# Patient Record
Sex: FEMALE | Race: WHITE | NOT HISPANIC OR LATINO | Employment: FULL TIME | URBAN - METROPOLITAN AREA
[De-identification: names, ages, dates, MRNs, and addresses within clinical notes are randomized per-mention and may not be internally consistent; named-entity substitution may affect disease eponyms.]

---

## 2024-11-22 ENCOUNTER — APPOINTMENT (OUTPATIENT)
Dept: OBSTETRICS AND GYNECOLOGY | Facility: CLINIC | Age: 34
End: 2024-11-22
Payer: COMMERCIAL

## 2024-11-22 VITALS — SYSTOLIC BLOOD PRESSURE: 138 MMHG | DIASTOLIC BLOOD PRESSURE: 88 MMHG | WEIGHT: 211.8 LBS | BODY MASS INDEX: 36.36 KG/M2

## 2024-11-22 DIAGNOSIS — Z32.01 PREGNANCY TEST POSITIVE (HHS-HCC): Primary | ICD-10-CM

## 2024-11-22 DIAGNOSIS — O99.210 OBESITY IN PREGNANCY (HHS-HCC): ICD-10-CM

## 2024-11-22 DIAGNOSIS — Z12.4 CERVICAL CANCER SCREENING: ICD-10-CM

## 2024-11-22 DIAGNOSIS — Z3A.01 6 WEEKS GESTATION OF PREGNANCY (HHS-HCC): ICD-10-CM

## 2024-11-22 DIAGNOSIS — Z34.80 SUPERVISION OF OTHER NORMAL PREGNANCY, ANTEPARTUM (HHS-HCC): ICD-10-CM

## 2024-11-22 PROCEDURE — 87491 CHLMYD TRACH DNA AMP PROBE: CPT

## 2024-11-22 PROCEDURE — 87591 N.GONORRHOEAE DNA AMP PROB: CPT

## 2024-11-22 PROCEDURE — 87086 URINE CULTURE/COLONY COUNT: CPT

## 2024-11-22 PROCEDURE — 0500F INITIAL PRENATAL CARE VISIT: CPT

## 2024-11-22 PROCEDURE — 87661 TRICHOMONAS VAGINALIS AMPLIF: CPT

## 2024-11-22 ASSESSMENT — EDINBURGH POSTNATAL DEPRESSION SCALE (EPDS)
THINGS HAVE BEEN GETTING ON TOP OF ME: NO, I HAVE BEEN COPING AS WELL AS EVER
THE THOUGHT OF HARMING MYSELF HAS OCCURRED TO ME: NEVER
I HAVE BEEN ABLE TO LAUGH AND SEE THE FUNNY SIDE OF THINGS: AS MUCH AS I ALWAYS COULD
I HAVE FELT SAD OR MISERABLE: NO, NOT AT ALL
I HAVE BLAMED MYSELF UNNECESSARILY WHEN THINGS WENT WRONG: NO, NEVER
TOTAL SCORE: 0
I HAVE LOOKED FORWARD WITH ENJOYMENT TO THINGS: AS MUCH AS I EVER DID
I HAVE BEEN SO UNHAPPY THAT I HAVE HAD DIFFICULTY SLEEPING: NOT AT ALL
I HAVE BEEN ANXIOUS OR WORRIED FOR NO GOOD REASON: NO, NOT AT ALL
I HAVE FELT SCARED OR PANICKY FOR NO GOOD REASON: NO, NOT AT ALL
I HAVE BEEN SO UNHAPPY THAT I HAVE BEEN CRYING: NO, NEVER

## 2024-11-22 NOTE — PROGRESS NOTES
"Ziyad Lu \"Ruth\" is a 34 y.o.  at 5w6d with a working estimated date of delivery of 2025, by Last Menstrual Period who presents for an initial prenatal visit. This pregnancy is unplanned, she is nervous but accepting.    Patient currently experiencing: nausea,    Bleeding or cramping since LMP: no  Taking prenatal vitamin: Yes  Ultrasound completed this pregnancy: No  Last pap: unknown     OB History    Para Term  AB Living   4 3 3     3   SAB IAB Ectopic Multiple Live Births           3      # Outcome Date GA Lbr Kolby/2nd Weight Sex Type Anes PTL Lv   4 Current            3 Term 12/20/15 39w0d   F Vag-Spont   RAVEN   2 Term 09 40w0d   M Vag-Spont   RAVEN   1 Term 07 40w0d   M Vag-Spont EPI  RAVEN      Complications: Hypertension     Prior pregnancy complications:  none   History of hypertension:  No    No past medical history on file.   No past surgical history on file.   Social History     Socioeconomic History    Marital status:      Spouse name: Sydney    Number of children: 3   Tobacco Use    Smoking status: Never    Smokeless tobacco: Never   Vaping Use    Vaping status: Never Used   Substance and Sexual Activity    Alcohol use: Never    Drug use: Never    Sexual activity: Yes     Partners: Male      [place]  Depression Scale Total: 0    Objective   Physical Exam  Weight: 96.1 kg (211 lb 12.8 oz)  TW.631 kg (5 lb 12.8 oz)   Pregravid BMI: 35.34  BP: 138/88    Physical Exam  Constitutional:       General: She is not in acute distress.     Appearance: Normal appearance. She is normal weight. She is not ill-appearing.   Genitourinary:      Vulva, rectum and urethral meatus normal.      No lesions in the vagina.      Right Labia: No rash, lesions, skin changes or Bartholin's cyst.     Left Labia: No lesions, skin changes, Bartholin's cyst or rash.     No labial fusion noted.      No vaginal discharge, erythema or tenderness.        Right " Adnexa: not tender, not full and no mass present.     Left Adnexa: not tender, not full and no mass present.     No cervical discharge, friability, lesion, polyp or nabothian cyst.      Uterus is not enlarged, fixed, tender or prolapsed.      No uterine mass detected.     Pelvic exam was performed with patient in the lithotomy position.   HENT:      Head: Normocephalic.      Nose: Nose normal. No congestion.      Mouth/Throat:      Mouth: Mucous membranes are moist.      Pharynx: Oropharynx is clear. No oropharyngeal exudate or posterior oropharyngeal erythema.   Eyes:      General:         Right eye: No discharge.         Left eye: No discharge.      Extraocular Movements: Extraocular movements intact.      Conjunctiva/sclera: Conjunctivae normal.      Pupils: Pupils are equal, round, and reactive to light.   Cardiovascular:      Rate and Rhythm: Normal rate and regular rhythm.      Heart sounds: Normal heart sounds.   Pulmonary:      Effort: Pulmonary effort is normal.      Breath sounds: Normal breath sounds and air entry.   Abdominal:      General: Abdomen is flat. There is no distension.      Palpations: Abdomen is soft.   Musculoskeletal:         General: No swelling, deformity or signs of injury. Normal range of motion.      Cervical back: Full passive range of motion without pain and normal range of motion.   Neurological:      General: No focal deficit present.      Mental Status: She is alert and oriented to person, place, and time. Mental status is at baseline.      Sensory: No sensory deficit.      Motor: No weakness.      Gait: Gait normal.   Skin:     General: Skin is warm and dry.      Findings: No bruising, erythema, lesion or rash.   Psychiatric:         Mood and Affect: Mood normal.         Behavior: Behavior normal.         Thought Content: Thought content normal.         Judgment: Judgment normal.   Vitals reviewed.          Assessment   Problem List Items Addressed This Visit       6 weeks  gestation of pregnancy ([facility])    Overview     Desired provider in labor: [x] CNM  [] Physician  [x] Blood Products: [x] Yes, accepts [] No, needs counseling  [x] Initial BMI: 35.34   [x] Prenatal Labs:   [x] Cervical Cancer Screening up to date  - 2024 result pending   [x] Rh status: pos  [] Genetic Screening:    [] NT US: (11-13 wks)  [] Baby ASA (if indicated):  [] Pregnancy dated by:     [] Anatomy US: (19-20 wks)  [] Federal Sterilization consent signed (if indicated):  [] 1hr GCT at 24-28wks:  [] Rhogam (if indicated):   [] Fetal Surveillance (if indicated):  [] Tdap (27-32 wks, may be given up to 36 wks if initial window missed):   [] RSV (32-36 wks) (Sept. to end of Jan):   [] Flu Vaccine:    [] Breastfeeding:  [] Postpartum Birth control method:   [] GBS at 36 - 37 wks:  [] 39 weeks discussion of IOL vs. Expectant management:  [] Mode of delivery ( anticipated ):          Obesity in pregnancy ([facility])    Overview     Growth scan 30 and 36 weeks, BPP or NST weekly 37 weeks to delivery, and recommended delivery 39 0/7 - 39 6/7   Starting BMI 35         Supervision of other normal pregnancy, antepartum ([facility])     Other Visit Diagnoses       Pregnancy test positive ([facility])    -  Primary    Relevant Orders    CBC Anemia Panel With Reflex,Pregnancy (Completed)    Hemoglobin Identification with Path Review    Type And Screen (Completed)    Urine Culture    Syphilis Screen with Reflex    Rubella Antibody, IgG    HIV 1/2 Antigen/Antibody Screen with Reflex to Confirmation    Hepatitis C Antibody    Hepatitis B Surface Antigen    C. trachomatis / N. gonorrhoeae, Amplified (Completed)    Trichomonas vaginalis, Amplified (Completed)    US MAC OB imaging order    Cervical cancer screening        Relevant Orders    THINPREP PAP TEST (>30)             Plan   - New OB resources provided and reviewed with particular attention to dietary, travel, and medication restrictions  - Oriented to practice, CNM vs. MD care  -  Reviewed IOM recommendations for weight gain given pt's BMI: 11-20 pounds (BMI greater than or equal to 30)  - Reviewed bleeding precautions, warning signs, when to call provider; phone number provided  - Routine NOB labs ordered  - NT ultrasound ordered  - Return in 4 weeks for routine prenatal care    Next visit:   - Hx htn?    - cffDNA?    MARTY Leach-MANUELAM

## 2024-11-23 ENCOUNTER — LAB (OUTPATIENT)
Dept: LAB | Facility: LAB | Age: 34
End: 2024-11-23
Payer: COMMERCIAL

## 2024-11-23 DIAGNOSIS — Z32.01 PREGNANCY TEST POSITIVE (HHS-HCC): ICD-10-CM

## 2024-11-23 PROBLEM — Z3A.01 6 WEEKS GESTATION OF PREGNANCY (HHS-HCC): Status: ACTIVE | Noted: 2024-11-23

## 2024-11-23 PROBLEM — O99.210 OBESITY IN PREGNANCY (HHS-HCC): Status: ACTIVE | Noted: 2024-11-23

## 2024-11-23 PROBLEM — Z34.80 SUPERVISION OF OTHER NORMAL PREGNANCY, ANTEPARTUM (HHS-HCC): Status: ACTIVE | Noted: 2024-11-23

## 2024-11-23 LAB
ABO GROUP (TYPE) IN BLOOD: NORMAL
ANTIBODY SCREEN: NORMAL
C TRACH RRNA SPEC QL NAA+PROBE: NEGATIVE
ERYTHROCYTE [DISTWIDTH] IN BLOOD BY AUTOMATED COUNT: 12.9 % (ref 11.5–14.5)
HBV SURFACE AG SERPL QL IA: NONREACTIVE
HCT VFR BLD AUTO: 38.3 % (ref 36–46)
HCV AB SER QL: NONREACTIVE
HGB BLD-MCNC: 13.3 G/DL (ref 12–16)
HIV 1+2 AB+HIV1 P24 AG SERPL QL IA: NONREACTIVE
MCH RBC QN AUTO: 31 PG (ref 26–34)
MCHC RBC AUTO-ENTMCNC: 34.7 G/DL (ref 32–36)
MCV RBC AUTO: 89 FL (ref 80–100)
N GONORRHOEA DNA SPEC QL PROBE+SIG AMP: NEGATIVE
NRBC BLD-RTO: 0 /100 WBCS (ref 0–0)
PLATELET # BLD AUTO: 296 X10*3/UL (ref 150–450)
RBC # BLD AUTO: 4.29 X10*6/UL (ref 4–5.2)
REFLEX ADDED, ANEMIA PANEL: NORMAL
RH FACTOR (ANTIGEN D): NORMAL
T VAGINALIS RRNA SPEC QL NAA+PROBE: NEGATIVE
WBC # BLD AUTO: 7 X10*3/UL (ref 4.4–11.3)

## 2024-11-23 PROCEDURE — 36415 COLL VENOUS BLD VENIPUNCTURE: CPT

## 2024-11-23 PROCEDURE — 83020 HEMOGLOBIN ELECTROPHORESIS: CPT

## 2024-11-23 PROCEDURE — 87340 HEPATITIS B SURFACE AG IA: CPT

## 2024-11-23 PROCEDURE — 83021 HEMOGLOBIN CHROMOTOGRAPHY: CPT

## 2024-11-23 PROCEDURE — 86900 BLOOD TYPING SEROLOGIC ABO: CPT

## 2024-11-23 PROCEDURE — 85027 COMPLETE CBC AUTOMATED: CPT

## 2024-11-23 PROCEDURE — 87389 HIV-1 AG W/HIV-1&-2 AB AG IA: CPT

## 2024-11-23 PROCEDURE — 86850 RBC ANTIBODY SCREEN: CPT

## 2024-11-23 PROCEDURE — 86901 BLOOD TYPING SEROLOGIC RH(D): CPT

## 2024-11-23 PROCEDURE — 86803 HEPATITIS C AB TEST: CPT

## 2024-11-23 PROCEDURE — 86317 IMMUNOASSAY INFECTIOUS AGENT: CPT

## 2024-11-23 PROCEDURE — 86780 TREPONEMA PALLIDUM: CPT

## 2024-11-24 LAB
BACTERIA UR CULT: NORMAL
RUBV IGG SERPL IA-ACNC: 1.4 IA
RUBV IGG SERPL QL IA: POSITIVE
TREPONEMA PALLIDUM IGG+IGM AB [PRESENCE] IN SERUM OR PLASMA BY IMMUNOASSAY: NONREACTIVE

## 2024-11-26 LAB
HEMOGLOBIN A2: 2.9 % (ref 2–3.5)
HEMOGLOBIN A: 96.7 % (ref 95.8–98)
HEMOGLOBIN F: 0.4 % (ref 0–2)
HEMOGLOBIN IDENTIFICATION INTERPRETATION: NORMAL
PATH REVIEW-HGB IDENTIFICATION: NORMAL

## 2024-11-27 ENCOUNTER — HOSPITAL ENCOUNTER (OUTPATIENT)
Dept: RADIOLOGY | Facility: CLINIC | Age: 34
Discharge: HOME | End: 2024-11-27
Payer: COMMERCIAL

## 2024-11-27 DIAGNOSIS — Z32.01 PREGNANCY TEST POSITIVE (HHS-HCC): ICD-10-CM

## 2024-11-27 PROCEDURE — 76817 TRANSVAGINAL US OBSTETRIC: CPT | Performed by: STUDENT IN AN ORGANIZED HEALTH CARE EDUCATION/TRAINING PROGRAM

## 2024-11-27 PROCEDURE — 76817 TRANSVAGINAL US OBSTETRIC: CPT

## 2024-11-27 PROCEDURE — 76801 OB US < 14 WKS SINGLE FETUS: CPT | Performed by: STUDENT IN AN ORGANIZED HEALTH CARE EDUCATION/TRAINING PROGRAM

## 2024-11-27 PROCEDURE — 76801 OB US < 14 WKS SINGLE FETUS: CPT

## 2024-12-09 LAB
CYTOLOGY CMNT CVX/VAG CYTO-IMP: NORMAL
HPV HR 12 DNA GENITAL QL NAA+PROBE: NEGATIVE
HPV HR GENOTYPES PNL CVX NAA+PROBE: NEGATIVE
HPV16 DNA SPEC QL NAA+PROBE: NEGATIVE
HPV18 DNA SPEC QL NAA+PROBE: NEGATIVE
LAB AP HPV GENOTYPE QUESTION: YES
LAB AP HPV HR: NORMAL
LABORATORY COMMENT REPORT: NORMAL
PATH REPORT.TOTAL CANCER: NORMAL

## 2024-12-17 ENCOUNTER — APPOINTMENT (OUTPATIENT)
Dept: OBSTETRICS AND GYNECOLOGY | Facility: CLINIC | Age: 34
End: 2024-12-17
Payer: COMMERCIAL

## 2024-12-17 VITALS — BODY MASS INDEX: 36.99 KG/M2 | SYSTOLIC BLOOD PRESSURE: 102 MMHG | WEIGHT: 215.5 LBS | DIASTOLIC BLOOD PRESSURE: 70 MMHG

## 2024-12-17 DIAGNOSIS — O99.210 OBESITY IN PREGNANCY (HHS-HCC): ICD-10-CM

## 2024-12-17 DIAGNOSIS — Z3A.10 10 WEEKS GESTATION OF PREGNANCY (HHS-HCC): ICD-10-CM

## 2024-12-17 DIAGNOSIS — Z34.80 SUPERVISION OF OTHER NORMAL PREGNANCY, ANTEPARTUM (HHS-HCC): Primary | ICD-10-CM

## 2024-12-18 ENCOUNTER — APPOINTMENT (OUTPATIENT)
Dept: LAB | Facility: LAB | Age: 34
End: 2024-12-18
Payer: COMMERCIAL

## 2024-12-24 PROBLEM — Z3A.10 10 WEEKS GESTATION OF PREGNANCY (HHS-HCC): Status: ACTIVE | Noted: 2024-11-23

## 2024-12-24 NOTE — PROGRESS NOTES
"Ob Visit  24     SUBJECTIVE      HPI: Leighann Lu \"Ruth\" is a 34 y.o.  at 11w3d here for RPNV.  She has no contractions, bleeding, or LOF.        OBJECTIVE  Visit Vitals  /70   Wt 97.8 kg (215 lb 8 oz)   LMP 10/12/2024   BMI 36.99 kg/m²   OB Status Pregnant   Smoking Status Never   BSA 2.1 m²            ASSESSMENT & PLAN    Leighann Lu \"Ruth\" is a 34 y.o.  at 11w3d here for the following concerns we addressed today:    Problem List Items Addressed This Visit       10 weeks gestation of pregnancy ([facility])    Overview     Desired provider in labor: [x] CNM  [] Physician  [x] Blood Products: [x] Yes, accepts [] No, needs counseling  [x] Initial BMI: 35.34   [x] Prenatal Labs:   [x] Cervical Cancer Screening up to date  -  result pending   [x] Rh status: pos  [] Genetic Screening:    [] NT US: (11-13 wks)  [] Baby ASA (if indicated):  [] Pregnancy dated by:     [] Anatomy US: (19-20 wks)  [] Federal Sterilization consent signed (if indicated):  [] 1hr GCT at 24-28wks:  [] Rhogam (if indicated):   [] Fetal Surveillance (if indicated):  [] Tdap (27-32 wks, may be given up to 36 wks if initial window missed):   [] RSV (32-36 wks) (Sept. to end of ):   [] Flu Vaccine:    [] Breastfeeding:  [] Postpartum Birth control method:   [] GBS at 36 - 37 wks:  [] 39 weeks discussion of IOL vs. Expectant management:  [] Mode of delivery ( anticipated ):          Obesity in pregnancy ([facility])    Overview     Growth scan 30 and 36 weeks, BPP or NST weekly 37 weeks to delivery, and recommended delivery 39 0/7 - 39 6/7   Starting BMI 35         Supervision of other normal pregnancy, antepartum ([facility]) - Primary    Relevant Orders    [facility] Prenatal Screen       Pre-test genetic counseling discussed and included:   Interpretation of family and medical histories to assess the probability of disease occurrence or recurrence;   Education about inheritance, genetic testing, disease " management, prevention and resources  Counseling to promote informed choices and adaptation to the risk or presented of a genetic condition  Counseling for psychological aspects of genetic testing.   RTC in 4 weeks      MARTY Leach-ELEANOR

## 2024-12-30 ENCOUNTER — APPOINTMENT (OUTPATIENT)
Dept: RADIOLOGY | Facility: CLINIC | Age: 34
End: 2024-12-30
Payer: COMMERCIAL

## 2025-01-06 ENCOUNTER — HOSPITAL ENCOUNTER (OUTPATIENT)
Dept: RADIOLOGY | Facility: CLINIC | Age: 35
Discharge: HOME | End: 2025-01-06
Payer: COMMERCIAL

## 2025-01-06 DIAGNOSIS — Z32.01 PREGNANCY TEST POSITIVE (HHS-HCC): ICD-10-CM

## 2025-01-06 DIAGNOSIS — O26.891 OTHER SPECIFIED PREGNANCY RELATED CONDITIONS, FIRST TRIMESTER (HHS-HCC): ICD-10-CM

## 2025-01-06 DIAGNOSIS — O99.211 OBESITY COMPLICATING PREGNANCY, FIRST TRIMESTER (HHS-HCC): ICD-10-CM

## 2025-01-06 PROCEDURE — 76816 OB US FOLLOW-UP PER FETUS: CPT

## 2025-01-06 PROCEDURE — 76813 OB US NUCHAL MEAS 1 GEST: CPT

## 2025-01-06 PROCEDURE — 76813 OB US NUCHAL MEAS 1 GEST: CPT | Performed by: STUDENT IN AN ORGANIZED HEALTH CARE EDUCATION/TRAINING PROGRAM

## 2025-01-14 ENCOUNTER — APPOINTMENT (OUTPATIENT)
Dept: OBSTETRICS AND GYNECOLOGY | Facility: CLINIC | Age: 35
End: 2025-01-14
Payer: COMMERCIAL

## 2025-01-14 VITALS — SYSTOLIC BLOOD PRESSURE: 110 MMHG | DIASTOLIC BLOOD PRESSURE: 60 MMHG | BODY MASS INDEX: 37.01 KG/M2 | WEIGHT: 215.6 LBS

## 2025-01-14 DIAGNOSIS — Z34.80 SUPERVISION OF OTHER NORMAL PREGNANCY, ANTEPARTUM (HHS-HCC): ICD-10-CM

## 2025-01-14 DIAGNOSIS — O99.210 OBESITY IN PREGNANCY (HHS-HCC): ICD-10-CM

## 2025-01-14 DIAGNOSIS — Z3A.14 14 WEEKS GESTATION OF PREGNANCY (HHS-HCC): Primary | ICD-10-CM

## 2025-01-14 PROCEDURE — 0501F PRENATAL FLOW SHEET: CPT

## 2025-01-14 NOTE — PROGRESS NOTES
"Ob Visit  25     SUBJECTIVE      HPI: Leighann Lu \"Ruth\" is a 34 y.o.  at 14w3d here for RPNV.  She has no contractions, bleeding, or LOF.  Patient reports no concerns today        OBJECTIVE  Visit Vitals  /60   Wt 97.8 kg (215 lb 9.6 oz)   LMP 10/12/2024   BMI 37.01 kg/m²   OB Status Pregnant   Smoking Status Never   BSA 2.1 m²            ASSESSMENT & PLAN    Leighann Lu \"Ruth\" is a 34 y.o.  at 14w3d here for the following concerns we addressed today:    Problem List Items Addressed This Visit       14 weeks gestation of pregnancy ([facility]) - Primary    Overview     Desired provider in labor: [x] CNM  [] Physician  [x] Blood Products: [x] Yes, accepts [] No, needs counseling  [x] Initial BMI: 35.34   [x] Prenatal Labs:   [x] Cervical Cancer Screening up to date  -  result pending   [x] Rh status: pos  [x] Genetic Screening:  ccfDNA  neg - its a girl!  [x] NT US: (11-13 wks)  [x] Baby ASA (if indicated): pt is taking   [x] Pregnancy dated by: 7.4 week US     [] Anatomy US: (19-20 wks)  [] Federal Sterilization consent signed (if indicated):  [] 1hr GCT at 24-28wks:  [] Rhogam (if indicated):   [] Fetal Surveillance (if indicated):  [] Tdap (27-32 wks, may be given up to 36 wks if initial window missed):   [] RSV (32-36 wks) (Sept. to end of ):   [] Flu Vaccine:    [] Breastfeeding:  [] Postpartum Birth control method:   [] GBS at 36 - 37 wks:  [] 39 weeks discussion of IOL vs. Expectant management:  [] Mode of delivery ( anticipated ):          Obesity in pregnancy ([facility])    Overview     Growth scan 30 and 36 weeks, BPP or NST weekly 37 weeks to delivery, and recommended delivery 39 0/7 - 39 6/7   Starting BMI 35         Supervision of other normal pregnancy, antepartum ([facility])         RTC in 4 weeks      MARTY Leach-MANUELAM   "

## 2025-02-10 PROBLEM — Z3A.18 18 WEEKS GESTATION OF PREGNANCY (HHS-HCC): Status: ACTIVE | Noted: 2024-11-23

## 2025-02-10 NOTE — PROGRESS NOTES
"Ob Visit  02/10/25     SUBJECTIVE      HPI: Leighann Lu \"Ruth\" is a 35 y.o.  at 18w3d here for RPNV.  She has no contractions, bleeding, or LOF. Reports not yet feeling normal fetal movement.        OBJECTIVE  Visit Vitals  LMP 10/12/2024   OB Status Pregnant   Smoking Status Never            ASSESSMENT & PLAN    Leighann Lu \"Ruth\" is a 35 y.o.  at 18w3d here for the following concerns we addressed today:    Problem List Items Addressed This Visit       18 weeks gestation of pregnancy ([facility]) - Primary    Overview     Desired provider in labor: [x] CNM  [] Physician  [x] Blood Products: [x] Yes, accepts [] No, needs counseling  [x] Initial BMI: 35.34   [x] Prenatal Labs:   [x] Cervical Cancer Screening up to date  -  result pending   [x] Rh status: pos  [x] Genetic Screening:  ccfDNA  neg - its a girl!  [x] NT US: (11-13 wks)  [x] Baby ASA (if indicated): pt is taking   [x] Pregnancy dated by: 7.4 week US     [] Anatomy US: (19-20 wks)  [] Federal Sterilization consent signed (if indicated):  [] 1hr GCT at 24-28wks:  [] Rhogam (if indicated):   [] Fetal Surveillance (if indicated):  [] Tdap (27-32 wks, may be given up to 36 wks if initial window missed):   [] RSV (32-36 wks) (Sept. to end of ):   [] Flu Vaccine:    [] Breastfeeding:  [] Postpartum Birth control method:   [] GBS at 36 - 37 wks:  [] 39 weeks discussion of IOL vs. Expectant management:  [] Mode of delivery ( anticipated ):          Obesity in pregnancy ([facility])    Overview     Growth scan 30 and 36 weeks, BPP or NST weekly 37 weeks to delivery, and recommended delivery 39 0/7 - 39 6/7   Starting BMI 35         Supervision of other normal pregnancy, antepartum ([facility])         RTC in 4 weeks      Dorothy Edouard, MARTY-MANUELAM   "

## 2025-02-11 ENCOUNTER — APPOINTMENT (OUTPATIENT)
Dept: OBSTETRICS AND GYNECOLOGY | Facility: CLINIC | Age: 35
End: 2025-02-11
Payer: COMMERCIAL

## 2025-02-11 VITALS — WEIGHT: 217.6 LBS | DIASTOLIC BLOOD PRESSURE: 58 MMHG | BODY MASS INDEX: 37.35 KG/M2 | SYSTOLIC BLOOD PRESSURE: 106 MMHG

## 2025-02-11 DIAGNOSIS — Z3A.18 18 WEEKS GESTATION OF PREGNANCY (HHS-HCC): Primary | ICD-10-CM

## 2025-02-11 DIAGNOSIS — O99.210 OBESITY IN PREGNANCY (HHS-HCC): ICD-10-CM

## 2025-02-11 DIAGNOSIS — Z34.80 SUPERVISION OF OTHER NORMAL PREGNANCY, ANTEPARTUM (HHS-HCC): ICD-10-CM

## 2025-02-11 PROCEDURE — 0501F PRENATAL FLOW SHEET: CPT

## 2025-02-11 RX ORDER — ASPIRIN 81 MG/1
81 TABLET ORAL DAILY
COMMUNITY

## 2025-02-24 ENCOUNTER — HOSPITAL ENCOUNTER (OUTPATIENT)
Dept: RADIOLOGY | Facility: CLINIC | Age: 35
Discharge: HOME | End: 2025-02-24
Payer: COMMERCIAL

## 2025-02-24 DIAGNOSIS — O09.522 ADVANCED MATERNAL AGE IN MULTIGRAVIDA, SECOND TRIMESTER (HHS-HCC): ICD-10-CM

## 2025-02-24 DIAGNOSIS — Z32.01 PREGNANCY TEST POSITIVE (HHS-HCC): ICD-10-CM

## 2025-02-24 DIAGNOSIS — O99.210 OBESITY IN PREGNANCY (HHS-HCC): ICD-10-CM

## 2025-02-24 PROCEDURE — 76811 OB US DETAILED SNGL FETUS: CPT

## 2025-02-24 PROCEDURE — 76811 OB US DETAILED SNGL FETUS: CPT | Performed by: OBSTETRICS & GYNECOLOGY

## 2025-02-25 PROBLEM — Q27.0 SINGLE UMBILICAL ARTERY (HHS-HCC): Status: ACTIVE | Noted: 2025-02-25

## 2025-03-11 ENCOUNTER — APPOINTMENT (OUTPATIENT)
Dept: OBSTETRICS AND GYNECOLOGY | Facility: CLINIC | Age: 35
End: 2025-03-11
Payer: COMMERCIAL

## 2025-03-11 VITALS — WEIGHT: 222.4 LBS | DIASTOLIC BLOOD PRESSURE: 78 MMHG | SYSTOLIC BLOOD PRESSURE: 108 MMHG | BODY MASS INDEX: 38.17 KG/M2

## 2025-03-11 DIAGNOSIS — O99.719 PRURITUS OF PREGNANCY, ANTEPARTUM (HHS-HCC): ICD-10-CM

## 2025-03-11 DIAGNOSIS — Q27.0 SINGLE UMBILICAL ARTERY (HHS-HCC): ICD-10-CM

## 2025-03-11 DIAGNOSIS — L29.9 PRURITUS OF PREGNANCY, ANTEPARTUM (HHS-HCC): ICD-10-CM

## 2025-03-11 DIAGNOSIS — Z3A.22 22 WEEKS GESTATION OF PREGNANCY (HHS-HCC): Primary | ICD-10-CM

## 2025-03-11 DIAGNOSIS — Z34.80 SUPERVISION OF OTHER NORMAL PREGNANCY, ANTEPARTUM (HHS-HCC): ICD-10-CM

## 2025-03-11 DIAGNOSIS — O99.210 OBESITY IN PREGNANCY (HHS-HCC): ICD-10-CM

## 2025-03-11 DIAGNOSIS — L29.9 ITCHING OF BOTH HANDS: ICD-10-CM

## 2025-03-11 PROCEDURE — 0501F PRENATAL FLOW SHEET: CPT

## 2025-03-11 NOTE — LETTER
March 11, 2025     Patient: Leighann Lu   YOB: 1990   Date of Visit: 3/11/2025       To Whom It May Concern:    Due date: 7/12/2025    Because of my patient's pregnant medical condition the patient:  •may not be put at risk of being kicked in the stomach.   •may not climb ladders or work on raises or slippery surfaces.   •may not lift more than 25 more than three times per day.    She is able to continue working with a reasonable accommodation. Suggested accommodations include:   •Acquisition of equipment for sitting.   •More frequent or longer breaks.    •Ability to eat or drink water as needed.  •Ability to attend pre-scheduled medical appointments.   •Assistance with lifting or other manual labor.   •Temporarily modified work duties .  •Modified work schedules or telecommuting.  •Ability to work 8 hour shifts and a 40 hour work week.    The patient's condition and need for accommodation is expected to last until she delivers her baby.    For more information, you may wish to consult the [employer] at www.askjan.org <http://www.askjan.org>.         Sincerely,        SANDRA Leach

## 2025-03-11 NOTE — PROGRESS NOTES
"Ob Visit  25     SUBJECTIVE      HPI: Leighann Lu \"Ruth\" is a 35 y.o.  at 22w3d here for RPNV.  She has no contractions, bleeding, or LOF. Reports normal fetal movement.        OBJECTIVE  Visit Vitals  /78   Wt 101 kg (222 lb 6.4 oz)   LMP 10/12/2024   BMI 38.17 kg/m²   OB Status Pregnant   Smoking Status Never   BSA 2.14 m²            ASSESSMENT & PLAN    Leighann Lu \"Ruth\" is a 35 y.o.  at 22w3d here for the following concerns we addressed today:    Problem List Items Addressed This Visit       22 weeks gestation of pregnancy ([facility]) - Primary    Overview     Desired provider in labor: [x] CNM  [] Physician  [x] Blood Products: [x] Yes, accepts [] No, needs counseling  [x] Initial BMI: 35.34   [x] Prenatal Labs:   [x] Cervical Cancer Screening up to date  -  result pending   [x] Rh status: pos  [x] Genetic Screening:  ccfDNA  neg - its a girl!  [x] NT US: (11-13 wks)  [x] Baby ASA (if indicated): pt is taking   [x] Pregnancy dated by: 7.4 week US     [x] Anatomy US: (19-20 wks)  [] Federal Sterilization consent signed (if indicated):  [] 1hr GCT at 24-28wks:  [] Rhogam (if indicated):   [] Fetal Surveillance (if indicated):  [] Tdap (27-32 wks, may be given up to 36 wks if initial window missed):   [] RSV (32-36 wks) (Sept. to end of ):   [] Flu Vaccine:    [] Breastfeeding:  [] Postpartum Birth control method:   [] GBS at 36 - 37 wks:  [] 39 weeks discussion of IOL vs. Expectant management:  [] Mode of delivery ( anticipated ):          Itching of both hands    Relevant Orders    Comprehensive Metabolic Panel (Completed)    Bile Acids, Total    Obesity in pregnancy ([facility])    Overview     Growth scan 30 and 36 weeks, BPP or NST weekly 37 weeks to delivery, and recommended delivery 39 0/7 - 39 6/7   Starting BMI 35         Pruritus of pregnancy, antepartum ([facility])    Overview     Patient reports itching on hands and feet that has been getting worse and areas " of itching on her body. Labs ordered 3/11         Single umbilical artery ([facility])    Supervision of other normal pregnancy, antepartum ([facility])         RTC in 4 weeks      Dorothy Edouard, MARTY-MANUELAM

## 2025-03-12 PROBLEM — L29.9 ITCHING OF BOTH HANDS: Status: ACTIVE | Noted: 2025-03-12

## 2025-03-12 PROBLEM — O99.719: Status: ACTIVE | Noted: 2025-03-12

## 2025-03-12 PROBLEM — L29.9: Status: ACTIVE | Noted: 2025-03-12

## 2025-03-14 LAB
ALBUMIN SERPL-MCNC: 3.7 G/DL (ref 3.6–5.1)
ALP SERPL-CCNC: 50 U/L (ref 31–125)
ALT SERPL-CCNC: 9 U/L (ref 6–29)
ANION GAP SERPL CALCULATED.4IONS-SCNC: 10 MMOL/L (CALC) (ref 7–17)
AST SERPL-CCNC: 15 U/L (ref 10–30)
BILE AC SERPL-SCNC: 2 UMOL/L (ref 0–10)
BILIRUB SERPL-MCNC: 0.4 MG/DL (ref 0.2–1.2)
BUN SERPL-MCNC: 5 MG/DL (ref 7–25)
CALCIUM SERPL-MCNC: 8.7 MG/DL (ref 8.6–10.2)
CHLORIDE SERPL-SCNC: 104 MMOL/L (ref 98–110)
CO2 SERPL-SCNC: 23 MMOL/L (ref 20–32)
CREAT SERPL-MCNC: 0.47 MG/DL (ref 0.5–0.97)
EGFRCR SERPLBLD CKD-EPI 2021: 126 ML/MIN/1.73M2
GLUCOSE SERPL-MCNC: 87 MG/DL (ref 65–99)
POTASSIUM SERPL-SCNC: 3.5 MMOL/L (ref 3.5–5.3)
PROT SERPL-MCNC: 6.7 G/DL (ref 6.1–8.1)
SODIUM SERPL-SCNC: 137 MMOL/L (ref 135–146)

## 2025-04-08 ENCOUNTER — APPOINTMENT (OUTPATIENT)
Dept: OBSTETRICS AND GYNECOLOGY | Facility: CLINIC | Age: 35
End: 2025-04-08
Payer: COMMERCIAL

## 2025-04-08 VITALS — WEIGHT: 231 LBS | BODY MASS INDEX: 39.65 KG/M2 | DIASTOLIC BLOOD PRESSURE: 82 MMHG | SYSTOLIC BLOOD PRESSURE: 112 MMHG

## 2025-04-08 DIAGNOSIS — Z34.80 SUPERVISION OF OTHER NORMAL PREGNANCY, ANTEPARTUM (HHS-HCC): ICD-10-CM

## 2025-04-08 DIAGNOSIS — Z3A.26 26 WEEKS GESTATION OF PREGNANCY (HHS-HCC): Primary | ICD-10-CM

## 2025-04-08 DIAGNOSIS — Q27.0 SINGLE UMBILICAL ARTERY (HHS-HCC): ICD-10-CM

## 2025-04-08 DIAGNOSIS — O99.719 PRURITUS OF PREGNANCY, ANTEPARTUM (HHS-HCC): ICD-10-CM

## 2025-04-08 DIAGNOSIS — O99.210 OBESITY IN PREGNANCY (HHS-HCC): ICD-10-CM

## 2025-04-08 DIAGNOSIS — L29.9 PRURITUS OF PREGNANCY, ANTEPARTUM (HHS-HCC): ICD-10-CM

## 2025-04-08 DIAGNOSIS — L29.9 ITCHING OF BOTH HANDS: ICD-10-CM

## 2025-04-08 PROCEDURE — 0501F PRENATAL FLOW SHEET: CPT

## 2025-04-08 NOTE — LETTER
April 9, 2025     Patient: Leighann Lu   YOB: 1990   Date of Visit: 4/8/2025       To Whom It May Concern:    Because of my patient's pregnant medical condition, she:    may not be put at risk of being kicked in the stomach   may not climb ladders or work on surfaces off the ground  may not lift or pull no more than 25 pounds more than three times per day     She is able to continue working with a reasonable accommodation. Suggested accommodations include:     Acquisition of equipment for sitting; please provide a chair  More frequent or longer breaks   Ability to eat or drink water as needed for 5-10 min every 2-3 hrs as needed.  Ability to attend pre-scheduled medical appointments   Assistance with lifting or other manual labor   Temporarily modified work duties  Ability to work 8 hour shifts and a 40 hour work week   No standing more than 2-4 hours      Given the strenuous nature of her position, please consider her for temporary transfer to light duty or an alternate position.       The patient's condition and need for accommodation is expected to last until she delivers.        Dorothy Edouard, MARTY-ELEANOR

## 2025-04-08 NOTE — PROGRESS NOTES
"Ob Visit  25     SUBJECTIVE      HPI: Leighann Lu \"Jase" is a 35 y.o.  at 26w3d here for RPNV.  She has no contractions, bleeding, or LOF. Reports normal fetal movement. Patient reports no concerns today.    She has been having trouble performing her job  - working in a factory in a labor intensive role, lots of pushing heavy items. She is aware of recommended pregnancy restrictions. Employer is willing to modify her duties but needs a letter.        OBJECTIVE  Visit Vitals  /82   Wt 105 kg (231 lb)   LMP 10/12/2024   BMI 39.65 kg/m²   OB Status Pregnant   Smoking Status Never   BSA 2.18 m²            ASSESSMENT & PLAN    Leighann Lu \"Jase" is a 35 y.o.  at 26w3d here for the following concerns we addressed today:    Problem List Items Addressed This Visit       26 weeks gestation of pregnancy ([facility]) - Primary    Overview     Desired provider in labor: [x] CNM  [] Physician  [x] Blood Products: [x] Yes, accepts [] No, needs counseling  [x] Initial BMI: 35.34   [x] Prenatal Labs:   [x] Cervical Cancer Screening up to date  -  result pending   [x] Rh status: pos  [x] Genetic Screening:  ccfDNA  neg - its a girl!  [x] NT US: (11-13 wks)  [x] Baby ASA (if indicated): pt is taking   [x] Pregnancy dated by: 7.4 week US     [x] Anatomy US: (19-20 wks)  [] Federal Sterilization consent signed (if indicated):  [] 1hr GCT at 24-28wks:  [] Rhogam (if indicated):   [] Fetal Surveillance (if indicated):  [] Tdap (27-32 wks, may be given up to 36 wks if initial window missed):   [] RSV (32-36 wks) (Sept. to end of ):   [] Flu Vaccine:    [] Breastfeeding:  [] Postpartum Birth control method:   [] GBS at 36 - 37 wks:  [] 39 weeks discussion of IOL vs. Expectant management:  [] Mode of delivery ( anticipated ):          Relevant Orders    Glucose, 1 Hour Screen, Pregnancy    CBC Anemia Panel With Reflex, Pregnancy    Syphilis Screen with Reflex    RESOLVED: Itching of both hands "    Obesity in pregnancy ([facility])    Overview     Growth scan 30 and 36 weeks, BPP or NST weekly 37 weeks to delivery, and recommended delivery 39 0/7 - 39 6/7   Starting BMI 35         Pruritus of pregnancy, antepartum ([facility])    Overview     Patient reports itching on hands and feet that has been getting worse and areas of itching on her body. 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

## 2025-04-13 ENCOUNTER — TELEPHONE (OUTPATIENT)
Dept: OBSTETRICS AND GYNECOLOGY | Facility: HOSPITAL | Age: 35
End: 2025-04-13
Payer: COMMERCIAL

## 2025-04-13 PROBLEM — L29.9 ITCHING OF BOTH HANDS: Status: RESOLVED | Noted: 2025-03-12 | Resolved: 2025-04-13

## 2025-04-13 NOTE — TELEPHONE ENCOUNTER
25  12:45p    Pt. Concern:  Patient called answering service to report small amount of bleeding upon wiping when she last used the bathroom. Denies abdominal/pelvic pain. Denies vaginal symptoms or urinary concerns. Endorses good fetal movement. Denies recent intercourse. States she has been very actively cleaning today.    Plan:  Reviewed unremarkable imaging ([facility])  Discussed s/s  labor --> no h/o  labor/delivery. Patient reassured.  Discussed s/s transient infx such as UTI, BV, VVC. If these symptoms present themselves instructed to call the office tomorrow morning for a same-day appointment and hands-on assessment/eval.  Discussed monitoring the bleeding. If consistent throughout the day with no other concerning symptoms, instructed to call the office for same day appointment tomorrow. If bleeding becomes heavier, accompanied with clots or new onset abdominal/pelvic pain, discussed calling back and preparing to head to [facility] for delivery given her GA.   Questions answered, patient verbalizes understanding and agrees to this POC

## 2025-04-14 ENCOUNTER — LAB (OUTPATIENT)
Dept: LAB | Facility: HOSPITAL | Age: 35
End: 2025-04-14
Payer: COMMERCIAL

## 2025-04-14 LAB
ERYTHROCYTE [DISTWIDTH] IN BLOOD BY AUTOMATED COUNT: 12.6 % (ref 11.5–14.5)
HCT VFR BLD AUTO: 35.6 % (ref 36–46)
HGB BLD-MCNC: 11.8 G/DL (ref 12–16)
MCH RBC QN AUTO: 29.8 PG (ref 26–34)
MCHC RBC AUTO-ENTMCNC: 33.1 G/DL (ref 32–36)
MCV RBC AUTO: 90 FL (ref 80–100)
NRBC BLD-RTO: 0 /100 WBCS (ref 0–0)
PLATELET # BLD AUTO: 299 X10*3/UL (ref 150–450)
RBC # BLD AUTO: 3.96 X10*6/UL (ref 4–5.2)
REFLEX ADDED, ANEMIA PANEL: NORMAL
WBC # BLD AUTO: 10.1 X10*3/UL (ref 4.4–11.3)

## 2025-04-14 PROCEDURE — 85027 COMPLETE CBC AUTOMATED: CPT

## 2025-04-17 LAB
GLUCOSE 1H P 50 G GLC PO SERPL-MCNC: 119 MG/DL
T PALLIDUM AB SER QL IA: NEGATIVE

## 2025-04-22 ENCOUNTER — APPOINTMENT (OUTPATIENT)
Dept: OBSTETRICS AND GYNECOLOGY | Facility: CLINIC | Age: 35
End: 2025-04-22
Payer: COMMERCIAL

## 2025-04-22 VITALS — DIASTOLIC BLOOD PRESSURE: 70 MMHG | BODY MASS INDEX: 39.55 KG/M2 | SYSTOLIC BLOOD PRESSURE: 122 MMHG | WEIGHT: 230.4 LBS

## 2025-04-22 DIAGNOSIS — Z71.85 VACCINE COUNSELING: ICD-10-CM

## 2025-04-22 DIAGNOSIS — O99.210 OBESITY IN PREGNANCY (HHS-HCC): ICD-10-CM

## 2025-04-22 DIAGNOSIS — Z3A.28 28 WEEKS GESTATION OF PREGNANCY (HHS-HCC): Primary | ICD-10-CM

## 2025-04-22 DIAGNOSIS — Q27.0 SINGLE UMBILICAL ARTERY (HHS-HCC): ICD-10-CM

## 2025-04-22 DIAGNOSIS — Z34.80 SUPERVISION OF OTHER NORMAL PREGNANCY, ANTEPARTUM (HHS-HCC): ICD-10-CM

## 2025-04-22 DIAGNOSIS — Z23 NEED FOR TDAP VACCINATION: ICD-10-CM

## 2025-04-22 DIAGNOSIS — O99.719 PRURITUS OF PREGNANCY, ANTEPARTUM (HHS-HCC): ICD-10-CM

## 2025-04-22 DIAGNOSIS — L29.9 PRURITUS OF PREGNANCY, ANTEPARTUM (HHS-HCC): ICD-10-CM

## 2025-04-22 PROCEDURE — 0501F PRENATAL FLOW SHEET: CPT

## 2025-04-22 PROCEDURE — 90471 IMMUNIZATION ADMIN: CPT

## 2025-04-22 PROCEDURE — 90715 TDAP VACCINE 7 YRS/> IM: CPT

## 2025-04-22 ASSESSMENT — EDINBURGH POSTNATAL DEPRESSION SCALE (EPDS)
I HAVE BEEN ANXIOUS OR WORRIED FOR NO GOOD REASON: NO, NOT AT ALL
I HAVE BEEN ABLE TO LAUGH AND SEE THE FUNNY SIDE OF THINGS: AS MUCH AS I ALWAYS COULD
THINGS HAVE BEEN GETTING ON TOP OF ME: NO, I HAVE BEEN COPING AS WELL AS EVER
I HAVE FELT SCARED OR PANICKY FOR NO GOOD REASON: NO, NOT AT ALL
THE THOUGHT OF HARMING MYSELF HAS OCCURRED TO ME: NEVER
I HAVE BEEN SO UNHAPPY THAT I HAVE HAD DIFFICULTY SLEEPING: NOT AT ALL
I HAVE LOOKED FORWARD WITH ENJOYMENT TO THINGS: AS MUCH AS I EVER DID
I HAVE FELT SAD OR MISERABLE: NO, NOT AT ALL

## 2025-05-05 ENCOUNTER — HOSPITAL ENCOUNTER (OUTPATIENT)
Dept: RADIOLOGY | Facility: CLINIC | Age: 35
Discharge: HOME | End: 2025-05-05
Payer: COMMERCIAL

## 2025-05-05 DIAGNOSIS — O36.5930 MATERNAL CARE FOR OTHER KNOWN OR SUSPECTED POOR FETAL GROWTH, THIRD TRIMESTER, NOT APPLICABLE OR UNSPECIFIED: ICD-10-CM

## 2025-05-05 DIAGNOSIS — Z32.01 PREGNANCY TEST POSITIVE (HHS-HCC): ICD-10-CM

## 2025-05-05 DIAGNOSIS — O99.213 OBESITY COMPLICATING PREGNANCY, THIRD TRIMESTER (HHS-HCC): ICD-10-CM

## 2025-05-05 DIAGNOSIS — O09.899 SINGLE UMBILICAL ARTERY AFFECTING MANAGEMENT OF MOTHER IN SINGLETON PREGNANCY, ANTEPARTUM (HHS-HCC): ICD-10-CM

## 2025-05-05 PROCEDURE — 76816 OB US FOLLOW-UP PER FETUS: CPT

## 2025-05-05 PROCEDURE — 76819 FETAL BIOPHYS PROFIL W/O NST: CPT | Performed by: OBSTETRICS & GYNECOLOGY

## 2025-05-05 PROCEDURE — 76820 UMBILICAL ARTERY ECHO: CPT | Performed by: OBSTETRICS & GYNECOLOGY

## 2025-05-05 PROCEDURE — 76820 UMBILICAL ARTERY ECHO: CPT

## 2025-05-05 PROCEDURE — 76816 OB US FOLLOW-UP PER FETUS: CPT | Performed by: OBSTETRICS & GYNECOLOGY

## 2025-05-06 ENCOUNTER — APPOINTMENT (OUTPATIENT)
Dept: OBSTETRICS AND GYNECOLOGY | Facility: CLINIC | Age: 35
End: 2025-05-06
Payer: COMMERCIAL

## 2025-05-06 VITALS — WEIGHT: 234.2 LBS | BODY MASS INDEX: 40.2 KG/M2 | DIASTOLIC BLOOD PRESSURE: 82 MMHG | SYSTOLIC BLOOD PRESSURE: 138 MMHG

## 2025-05-06 DIAGNOSIS — L29.9 PRURITUS OF PREGNANCY, ANTEPARTUM (HHS-HCC): ICD-10-CM

## 2025-05-06 DIAGNOSIS — Q27.0 SINGLE UMBILICAL ARTERY (HHS-HCC): ICD-10-CM

## 2025-05-06 DIAGNOSIS — O99.210 OBESITY IN PREGNANCY (HHS-HCC): ICD-10-CM

## 2025-05-06 DIAGNOSIS — O99.719 PRURITUS OF PREGNANCY, ANTEPARTUM (HHS-HCC): ICD-10-CM

## 2025-05-06 DIAGNOSIS — Z34.80 SUPERVISION OF OTHER NORMAL PREGNANCY, ANTEPARTUM (HHS-HCC): ICD-10-CM

## 2025-05-06 DIAGNOSIS — O36.5990 FETAL GROWTH RESTRICTION ANTEPARTUM (HHS-HCC): ICD-10-CM

## 2025-05-06 DIAGNOSIS — Z3A.30 30 WEEKS GESTATION OF PREGNANCY (HHS-HCC): Primary | ICD-10-CM

## 2025-05-06 PROCEDURE — 0501F PRENATAL FLOW SHEET: CPT

## 2025-05-06 NOTE — PROGRESS NOTES
"Ob Visit  25     SUBJECTIVE      HPI: Leighann Lu \"Ruth\" is a 35 y.o.  at 30w3d here for RPNV.  She has no contractions, bleeding, or LOF. Reports normal fetal movement. Patient reports no concerns          OBJECTIVE  Visit Vitals  /82   Wt 106 kg (234 lb 3.2 oz)   LMP 10/12/2024   BMI 40.20 kg/m²   OB Status Pregnant   Smoking Status Never   BSA 2.19 m²            ASSESSMENT & PLAN    Leighann Lu \"Ruth\" is a 35 y.o.  at 30w3d here for the following concerns we addressed today:    Problem List Items Addressed This Visit       30 weeks gestation of pregnancy ([facility]) - Primary    Overview   Desired provider in labor: [x] CNM  [] Physician  [x] Blood Products: [x] Yes, accepts [] No, needs counseling  [x] Initial BMI: 35.34   [x] Prenatal Labs:   [x] Cervical Cancer Screening up to date  -  result pending   [x] Rh status: pos  [x] Genetic Screening:  ccfDNA  neg - its a girl!  [x] NT US: (11-13 wks)  [x] Baby ASA (if indicated): pt is taking   [x] Pregnancy dated by: 7.4 week US     [x] Anatomy US: (19-20 wks)  [] Federal Sterilization consent signed (if indicated):  [x] 1hr GCT at 24-28wks:  [] Rhogam (if indicated):   [] Fetal Surveillance (if indicated):  [x] Tdap (27-32 wks, may be given up to 36 wks if initial window missed):   [] RSV (32-36 wks) (Sept. to end of ):   [] Flu Vaccine:    [] Breastfeeding:  [] Postpartum Birth control method:   [] GBS at 36 - 37 wks:  [] 39 weeks discussion of IOL vs. Expectant management:  [] Mode of delivery ( anticipated ):          Fetal growth restriction antepartum ([facility])    Overview    - 30w2d - Decreased interval fetal growth. The Ac is at the 9%, and the EFW is at the 7% percentile. repeat doppler, BPP and AFV evaluation scheduled weekly.   Added to case review for may              Obesity in pregnancy ([facility])    Overview   Growth scan 30 and 36 weeks, BPP or NST weekly 37 weeks to delivery, and recommended " delivery 39 0/7 - 39 6/7   Starting BMI 35         Pruritus of pregnancy, antepartum ([facility])    Overview   Patient reports itching on hands and feet that has been getting worse and areas of itching on her body. Labs ordered 3/11 - neg          Single umbilical artery ([facility])    Supervision of other normal pregnancy, antepartum ([facility])         RTC in 2 weeks      Dorothy Edouard, APRN-CNM

## 2025-05-12 ENCOUNTER — HOSPITAL ENCOUNTER (OUTPATIENT)
Dept: RADIOLOGY | Facility: CLINIC | Age: 35
Discharge: HOME | End: 2025-05-12
Payer: COMMERCIAL

## 2025-05-12 DIAGNOSIS — Z32.01 PREGNANCY TEST POSITIVE (HHS-HCC): ICD-10-CM

## 2025-05-12 PROCEDURE — 76820 UMBILICAL ARTERY ECHO: CPT | Performed by: OBSTETRICS & GYNECOLOGY

## 2025-05-12 PROCEDURE — 76819 FETAL BIOPHYS PROFIL W/O NST: CPT | Performed by: OBSTETRICS & GYNECOLOGY

## 2025-05-12 PROCEDURE — 76815 OB US LIMITED FETUS(S): CPT | Performed by: OBSTETRICS & GYNECOLOGY

## 2025-05-12 PROCEDURE — 76820 UMBILICAL ARTERY ECHO: CPT

## 2025-05-12 PROCEDURE — 76815 OB US LIMITED FETUS(S): CPT

## 2025-05-19 ENCOUNTER — HOSPITAL ENCOUNTER (OUTPATIENT)
Dept: RADIOLOGY | Facility: CLINIC | Age: 35
Discharge: HOME | End: 2025-05-19
Payer: COMMERCIAL

## 2025-05-19 DIAGNOSIS — O09.899 SINGLE UMBILICAL ARTERY AFFECTING MANAGEMENT OF MOTHER IN SINGLETON PREGNANCY, ANTEPARTUM (HHS-HCC): ICD-10-CM

## 2025-05-19 DIAGNOSIS — O09.523 SUPERVISION OF ELDERLY MULTIGRAVIDA, THIRD TRIMESTER: ICD-10-CM

## 2025-05-19 DIAGNOSIS — O36.5930 MATERNAL CARE FOR OTHER KNOWN OR SUSPECTED POOR FETAL GROWTH, THIRD TRIMESTER, NOT APPLICABLE OR UNSPECIFIED: ICD-10-CM

## 2025-05-19 DIAGNOSIS — Z32.01 PREGNANCY TEST POSITIVE (HHS-HCC): ICD-10-CM

## 2025-05-19 PROCEDURE — 76819 FETAL BIOPHYS PROFIL W/O NST: CPT | Performed by: OBSTETRICS & GYNECOLOGY

## 2025-05-19 PROCEDURE — 76815 OB US LIMITED FETUS(S): CPT | Performed by: OBSTETRICS & GYNECOLOGY

## 2025-05-19 PROCEDURE — 76820 UMBILICAL ARTERY ECHO: CPT

## 2025-05-19 PROCEDURE — 76815 OB US LIMITED FETUS(S): CPT

## 2025-05-19 PROCEDURE — 76820 UMBILICAL ARTERY ECHO: CPT | Performed by: OBSTETRICS & GYNECOLOGY

## 2025-05-20 ENCOUNTER — APPOINTMENT (OUTPATIENT)
Dept: OBSTETRICS AND GYNECOLOGY | Facility: CLINIC | Age: 35
End: 2025-05-20
Payer: COMMERCIAL

## 2025-05-20 VITALS — DIASTOLIC BLOOD PRESSURE: 82 MMHG | WEIGHT: 239.2 LBS | SYSTOLIC BLOOD PRESSURE: 128 MMHG | BODY MASS INDEX: 41.06 KG/M2

## 2025-05-20 DIAGNOSIS — L29.9 PRURITUS OF PREGNANCY, ANTEPARTUM (HHS-HCC): ICD-10-CM

## 2025-05-20 DIAGNOSIS — O99.210 OBESITY IN PREGNANCY (HHS-HCC): ICD-10-CM

## 2025-05-20 DIAGNOSIS — Z3A.32 32 WEEKS GESTATION OF PREGNANCY (HHS-HCC): Primary | ICD-10-CM

## 2025-05-20 DIAGNOSIS — N89.8 VAGINAL DISCHARGE: ICD-10-CM

## 2025-05-20 DIAGNOSIS — O99.719 PRURITUS OF PREGNANCY, ANTEPARTUM (HHS-HCC): ICD-10-CM

## 2025-05-20 DIAGNOSIS — Q27.0 SINGLE UMBILICAL ARTERY (HHS-HCC): ICD-10-CM

## 2025-05-20 DIAGNOSIS — O36.5990 FETAL GROWTH RESTRICTION ANTEPARTUM (HHS-HCC): ICD-10-CM

## 2025-05-20 DIAGNOSIS — Z34.80 SUPERVISION OF OTHER NORMAL PREGNANCY, ANTEPARTUM (HHS-HCC): ICD-10-CM

## 2025-05-20 PROCEDURE — 0501F PRENATAL FLOW SHEET: CPT

## 2025-05-20 NOTE — PROGRESS NOTES
"Ob Visit  25     SUBJECTIVE      HPI: Leighann Lu \"Ruth\" is a 35 y.o.  at 32w3d here for RPNV.  She has no contractions, bleeding, or LOF. Reports normal fetal movement. Patient reports no concerns today        OBJECTIVE  Visit Vitals  /82   Wt 109 kg (239 lb 3.2 oz)   LMP 10/12/2024   BMI 41.06 kg/m²   OB Status Pregnant   Smoking Status Never   BSA 2.22 m²            ASSESSMENT & PLAN    Leighann Lu \"Ruth\" is a 35 y.o.  at 32w3d here for the following concerns we addressed today:    Problem List Items Addressed This Visit       32 weeks gestation of pregnancy ([facility]) - Primary    Overview   Desired provider in labor: [x] CNM  [] Physician  [x] Blood Products: [x] Yes, accepts [] No, needs counseling  [x] Initial BMI: 35.34   [x] Prenatal Labs:   [x] Cervical Cancer Screening up to date  -  result pending   [x] Rh status: pos  [x] Genetic Screening:  ccfDNA  neg - its a girl!  [x] NT US: (11-13 wks)  [x] Baby ASA (if indicated): pt is taking   [x] Pregnancy dated by: 7.4 week US     [x] Anatomy US: (19-20 wks)  [] Federal Sterilization consent signed (if indicated):  [x] 1hr GCT at 24-28wks:  [] Rhogam (if indicated):   [] Fetal Surveillance (if indicated):  [x] Tdap (27-32 wks, may be given up to 36 wks if initial window missed):   [] RSV (32-36 wks) (Sept. to end of ):   [] Flu Vaccine:    [] Breastfeeding:  [] Postpartum Birth control method:   [] GBS at 36 - 37 wks:  [] 39 weeks discussion of IOL vs. Expectant management:  [] Mode of delivery ( anticipated ):          Fetal growth restriction antepartum ([facility])    Overview    - 30w2d - Decreased interval fetal growth. The Ac is at the 9%, and the EFW is at the 7% percentile. repeat doppler, BPP and AFV evaluation scheduled weekly.   Added to case review for may              Obesity in pregnancy ([facility])    Overview   Growth scan 30 and 36 weeks, BPP or NST weekly 37 weeks to delivery, and " recommended delivery 39 0/7 - 39 6/7   Starting BMI 35         Pruritus of pregnancy, antepartum ([facility])    Overview   Patient reports itching on hands and feet that has been getting worse and areas of itching on her body. Labs ordered 3/11 - neg          Single umbilical artery ([facility])    Supervision of other normal pregnancy, antepartum ([facility])         RTC in 2 weeks      Dorothy Edouard, APRN-CNM

## 2025-05-21 ENCOUNTER — TELEPHONE (OUTPATIENT)
Dept: OBSTETRICS AND GYNECOLOGY | Facility: CLINIC | Age: 35
End: 2025-05-21
Payer: COMMERCIAL

## 2025-05-21 LAB — BV SCORE VAG QL: NORMAL

## 2025-05-27 ENCOUNTER — HOSPITAL ENCOUNTER (OUTPATIENT)
Dept: RADIOLOGY | Facility: CLINIC | Age: 35
Discharge: HOME | End: 2025-05-27
Payer: COMMERCIAL

## 2025-05-27 DIAGNOSIS — O99.213 OBESITY COMPLICATING PREGNANCY, THIRD TRIMESTER (HHS-HCC): ICD-10-CM

## 2025-05-27 DIAGNOSIS — O09.899 SINGLE UMBILICAL ARTERY AFFECTING MANAGEMENT OF MOTHER IN SINGLETON PREGNANCY, ANTEPARTUM (HHS-HCC): ICD-10-CM

## 2025-05-27 DIAGNOSIS — O09.523 SUPERVISION OF ELDERLY MULTIGRAVIDA, THIRD TRIMESTER: ICD-10-CM

## 2025-05-27 DIAGNOSIS — Z32.01 PREGNANCY TEST POSITIVE (HHS-HCC): ICD-10-CM

## 2025-05-27 DIAGNOSIS — O26.843 UTERINE SIZE DATE DISCREPANCY PREGNANCY, THIRD TRIMESTER (HHS-HCC): ICD-10-CM

## 2025-05-27 PROCEDURE — 76816 OB US FOLLOW-UP PER FETUS: CPT | Performed by: OBSTETRICS & GYNECOLOGY

## 2025-05-27 PROCEDURE — 76816 OB US FOLLOW-UP PER FETUS: CPT

## 2025-05-27 PROCEDURE — 76819 FETAL BIOPHYS PROFIL W/O NST: CPT | Performed by: OBSTETRICS & GYNECOLOGY

## 2025-05-27 PROCEDURE — 76819 FETAL BIOPHYS PROFIL W/O NST: CPT

## 2025-06-03 ENCOUNTER — APPOINTMENT (OUTPATIENT)
Dept: OBSTETRICS AND GYNECOLOGY | Facility: CLINIC | Age: 35
End: 2025-06-03
Payer: COMMERCIAL

## 2025-06-03 ENCOUNTER — APPOINTMENT (OUTPATIENT)
Dept: RADIOLOGY | Facility: CLINIC | Age: 35
End: 2025-06-03
Payer: COMMERCIAL

## 2025-06-04 ENCOUNTER — APPOINTMENT (OUTPATIENT)
Dept: OBSTETRICS AND GYNECOLOGY | Facility: CLINIC | Age: 35
End: 2025-06-04
Payer: COMMERCIAL

## 2025-06-04 VITALS — SYSTOLIC BLOOD PRESSURE: 144 MMHG | WEIGHT: 247.8 LBS | BODY MASS INDEX: 42.53 KG/M2 | DIASTOLIC BLOOD PRESSURE: 82 MMHG

## 2025-06-04 DIAGNOSIS — Q27.0 SINGLE UMBILICAL ARTERY (HHS-HCC): ICD-10-CM

## 2025-06-04 DIAGNOSIS — O16.9 ELEVATED BLOOD PRESSURE AFFECTING PREGNANCY, ANTEPARTUM: ICD-10-CM

## 2025-06-04 DIAGNOSIS — O99.719 PRURITUS OF PREGNANCY, ANTEPARTUM (HHS-HCC): ICD-10-CM

## 2025-06-04 DIAGNOSIS — L29.9 PRURITUS OF PREGNANCY, ANTEPARTUM (HHS-HCC): ICD-10-CM

## 2025-06-04 DIAGNOSIS — Z34.80 SUPERVISION OF OTHER NORMAL PREGNANCY, ANTEPARTUM (HHS-HCC): ICD-10-CM

## 2025-06-04 DIAGNOSIS — Z3A.34 34 WEEKS GESTATION OF PREGNANCY (HHS-HCC): Primary | ICD-10-CM

## 2025-06-04 DIAGNOSIS — O36.5990 FETAL GROWTH RESTRICTION ANTEPARTUM (HHS-HCC): ICD-10-CM

## 2025-06-04 DIAGNOSIS — O99.210 OBESITY IN PREGNANCY (HHS-HCC): ICD-10-CM

## 2025-06-04 NOTE — PROGRESS NOTES
"Ob Visit  25     SUBJECTIVE      HPI: Leighann Lu \"Ruth\" is a 35 y.o.  at 34w4d here for RPNV.  She has edenilson-kowalski contractions, no bleeding, or no LOF. Reports normal fetal movement.        OBJECTIVE  Visit Vitals  /82   Wt 112 kg (247 lb 12.8 oz)   LMP 10/12/2024   BMI 42.53 kg/m²   OB Status Pregnant   Smoking Status Never   BSA 2.25 m²            ASSESSMENT & PLAN    Leighann Lu \"Ruth\" is a 35 y.o.  at 34w4d here for the following concerns we addressed today:    Problem List Items Addressed This Visit       34 weeks gestation of pregnancy ([facility]) - Primary    Overview   Desired provider in labor: [x] CNM  [] Physician  [x] Blood Products: [x] Yes, accepts [] No, needs counseling  [x] Initial BMI: 35.34   [x] Prenatal Labs:   [x] Cervical Cancer Screening up to date  -  result pending   [x] Rh status: pos  [x] Genetic Screening:  ccfDNA  neg - its a girl!  [x] NT US: (11-13 wks)  [x] Baby ASA (if indicated): pt is taking   [x] Pregnancy dated by: 7.4 week US     [x] Anatomy US: (19-20 wks)  [] Federal Sterilization consent signed (if indicated):  [x] 1hr GCT at 24-28wks:  [] Rhogam (if indicated):   [] Fetal Surveillance (if indicated):  [x] Tdap (27-32 wks, may be given up to 36 wks if initial window missed):   [] RSV (32-36 wks) (Sept. to end of ):   [] Flu Vaccine:    [x] Breastfeeding: pumpimg, and bottle feeding; pump on hand already  [] Postpartum Birth control method:   [] GBS at 36 - 37 wks: talked about completing at next visit  [] 39 weeks discussion of IOL vs. Expectant management:  [] Mode of delivery ( anticipated ):          Elevated blood pressure affecting pregnancy, antepartum    Overview   Provided instructions for checking at home twice daily, log sheet given, warning signs reviewed. Baseline HELLP labs ordered            Relevant Orders    Comprehensive Metabolic Panel - STAT    CBC - STAT    Protein, Urine Random - STAT " (Completed)    Fetal growth restriction antepartum ([facility])    Overview   5/5 - 30w2d - Decreased interval fetal growth. The Ac is at the 9%, and the EFW is at the 7% percentile. repeat doppler, BPP and AFV evaluation scheduled weekly.   Added to case review for may     5/27/2025-BP score 8/8              Obesity in pregnancy ([facility])    Overview   Growth scan 30 and 36 weeks, BPP or NST weekly 37 weeks to delivery, and recommended delivery 39 0/7 - 39 6/7   Starting BMI 35         Pruritus of pregnancy, antepartum ([facility])    Overview   Patient reports itching on hands and feet that has been getting worse and areas of itching on her body. Labs ordered 3/11 - neg          Single umbilical artery ([facility])    Supervision of other normal pregnancy, antepartum ([facility])         RTC in 2 weeks      Rut CAMPO    I saw and evaluated the patient. I personally obtained the key and critical portions of the history and physical exam or was physically present for key and critical portions performed by the resident/SAMANTHA. I reviewed the resident/SAMANTHA's documentation and discussed the patient with the resident/SAMANTHA. I agree with the resident/SAMANTHA's medical decision making as documented in the note.      MARTY Leach-ELEANOR

## 2025-06-06 LAB
CREAT UR-MCNC: 122 MG/DL (ref 20–275)
PROT UR-MCNC: 15 MG/DL (ref 5–24)
PROT/CREAT UR: 0.12 MG/MG CREAT (ref 0.02–0.18)
PROT/CREAT UR: 123 MG/G CREAT (ref 24–184)

## 2025-06-10 ENCOUNTER — APPOINTMENT (OUTPATIENT)
Dept: RADIOLOGY | Facility: CLINIC | Age: 35
End: 2025-06-10
Payer: COMMERCIAL

## 2025-06-17 ENCOUNTER — APPOINTMENT (OUTPATIENT)
Dept: OBSTETRICS AND GYNECOLOGY | Facility: CLINIC | Age: 35
End: 2025-06-17
Payer: COMMERCIAL

## 2025-06-17 ENCOUNTER — HOSPITAL ENCOUNTER (OUTPATIENT)
Dept: RADIOLOGY | Facility: CLINIC | Age: 35
Discharge: HOME | End: 2025-06-17
Payer: COMMERCIAL

## 2025-06-17 VITALS — DIASTOLIC BLOOD PRESSURE: 78 MMHG | SYSTOLIC BLOOD PRESSURE: 118 MMHG | BODY MASS INDEX: 43.94 KG/M2 | WEIGHT: 256 LBS

## 2025-06-17 DIAGNOSIS — O99.210 OBESITY IN PREGNANCY (HHS-HCC): ICD-10-CM

## 2025-06-17 DIAGNOSIS — Q27.0 SINGLE UMBILICAL ARTERY (HHS-HCC): ICD-10-CM

## 2025-06-17 DIAGNOSIS — Z3A.34 34 WEEKS GESTATION OF PREGNANCY (HHS-HCC): Primary | ICD-10-CM

## 2025-06-17 DIAGNOSIS — O99.719 PRURITUS OF PREGNANCY, ANTEPARTUM (HHS-HCC): ICD-10-CM

## 2025-06-17 DIAGNOSIS — Z32.01 PREGNANCY TEST POSITIVE (HHS-HCC): ICD-10-CM

## 2025-06-17 DIAGNOSIS — Z34.80 SUPERVISION OF OTHER NORMAL PREGNANCY, ANTEPARTUM (HHS-HCC): ICD-10-CM

## 2025-06-17 DIAGNOSIS — O36.5990 FETAL GROWTH RESTRICTION ANTEPARTUM (HHS-HCC): ICD-10-CM

## 2025-06-17 DIAGNOSIS — Z3A.36 36 WEEKS GESTATION OF PREGNANCY (HHS-HCC): ICD-10-CM

## 2025-06-17 DIAGNOSIS — L29.9 PRURITUS OF PREGNANCY, ANTEPARTUM (HHS-HCC): ICD-10-CM

## 2025-06-17 DIAGNOSIS — O16.9 ELEVATED BLOOD PRESSURE AFFECTING PREGNANCY, ANTEPARTUM: ICD-10-CM

## 2025-06-17 PROCEDURE — 76819 FETAL BIOPHYS PROFIL W/O NST: CPT | Performed by: MEDICAL GENETICS

## 2025-06-17 PROCEDURE — 76816 OB US FOLLOW-UP PER FETUS: CPT | Performed by: MEDICAL GENETICS

## 2025-06-17 PROCEDURE — 76819 FETAL BIOPHYS PROFIL W/O NST: CPT

## 2025-06-17 PROCEDURE — 76816 OB US FOLLOW-UP PER FETUS: CPT

## 2025-06-17 NOTE — PROGRESS NOTES
"Ob Visit  25     SUBJECTIVE      HPI: Leighann Lu \"Jase" is a 35 y.o.  at 36w3d here for RPNV.  She has no contractions, bleeding, or LOF. Reports normal fetal movement. Patient reports no concerns today      Blood pressures at home have been 107-128/70-91 (>90 x1, all others have been 70-80's)    OBJECTIVE  Visit Vitals  /78   Wt 116 kg (256 lb)   LMP 10/12/2024   BMI 43.94 kg/m²   OB Status Pregnant   Smoking Status Never   BSA 2.29 m²            ASSESSMENT & PLAN    Leighann Lu \"Jase" is a 35 y.o.  at 36w3d here for the following concerns we addressed today:    Problem List Items Addressed This Visit       36 weeks gestation of pregnancy ([facility]) - Primary    Overview   Desired provider in labor: [x] CNM  [] Physician  [x] Blood Products: [x] Yes, accepts [] No, needs counseling  [x] Initial BMI: 35.34   [x] Prenatal Labs:   [x] Cervical Cancer Screening up to date  -  result pending   [x] Rh status: pos  [x] Genetic Screening:  ccfDNA  neg - its a girl!  [x] NT US: (11-13 wks)  [x] Baby ASA (if indicated): pt is taking   [x] Pregnancy dated by: 7.4 week US     [x] Anatomy US: (19-20 wks)  [] Federal Sterilization consent signed (if indicated):  [x] 1hr GCT at 24-28wks:  [] Rhogam (if indicated):   [] Fetal Surveillance (if indicated):  [x] Tdap (27-32 wks, may be given up to 36 wks if initial window missed):   [] RSV (32-36 wks) (Sept. to end of ):   [] Flu Vaccine:    [x] Breastfeeding: pumpimg, and bottle feeding; pump on hand already  [x] Postpartum Birth control method: does not want more kids - considering tubal   [x] GBS at 36 - 37 wks: talked about completing at next visit  [] 39 weeks discussion of IOL vs. Expectant management:  [] Mode of delivery ( anticipated ):          Relevant Orders    Group B Streptococcus (GBS) Prenatal Screen, Culture    Elevated blood pressure affecting pregnancy, antepartum    Overview   Provided instructions for " checking at home twice daily, log sheet given, warning signs reviewed. Baseline HELLP labs ordered 6/6           Fetal growth restriction antepartum ([facility])    Overview   5/5 - 30w2d - Decreased interval fetal growth. The Ac is at the 9%, and the EFW is at the 7% percentile. repeat doppler, BPP and AFV evaluation scheduled weekly.   Added to case review for may     5/27/2025-BP score 8/8              Obesity in pregnancy ([facility])    Overview   Growth scan 30 and 36 weeks, BPP or NST weekly 37 weeks to delivery, and recommended delivery 39 0/7 - 39 6/7   Starting BMI 35         Pruritus of pregnancy, antepartum ([facility])    Overview   Patient reports itching on hands and feet that has been getting worse and areas of itching on her body. Labs ordered 3/11 - neg          Single umbilical artery ([facility])    Supervision of other normal pregnancy, antepartum ([facility])         RTC in 1 weeks      MARTY Leach-ELEANOR

## 2025-06-20 LAB — GP B STREP SPEC QL CULT: NORMAL

## 2025-06-24 ENCOUNTER — HOSPITAL ENCOUNTER (INPATIENT)
Facility: HOSPITAL | Age: 35
LOS: 3 days | Discharge: HOME | End: 2025-06-27
Attending: STUDENT IN AN ORGANIZED HEALTH CARE EDUCATION/TRAINING PROGRAM | Admitting: OBSTETRICS & GYNECOLOGY
Payer: COMMERCIAL

## 2025-06-24 ENCOUNTER — APPOINTMENT (OUTPATIENT)
Dept: OBSTETRICS AND GYNECOLOGY | Facility: CLINIC | Age: 35
End: 2025-06-24
Payer: COMMERCIAL

## 2025-06-24 ENCOUNTER — APPOINTMENT (OUTPATIENT)
Dept: RADIOLOGY | Facility: CLINIC | Age: 35
End: 2025-06-24
Payer: COMMERCIAL

## 2025-06-24 VITALS
DIASTOLIC BLOOD PRESSURE: 108 MMHG | WEIGHT: 247.2 LBS | SYSTOLIC BLOOD PRESSURE: 160 MMHG | BODY MASS INDEX: 42.43 KG/M2

## 2025-06-24 DIAGNOSIS — Z34.80 SUPERVISION OF OTHER NORMAL PREGNANCY, ANTEPARTUM (HHS-HCC): ICD-10-CM

## 2025-06-24 DIAGNOSIS — L29.9 PRURITUS OF PREGNANCY, ANTEPARTUM (HHS-HCC): ICD-10-CM

## 2025-06-24 DIAGNOSIS — Z3A.37 37 WEEKS GESTATION OF PREGNANCY (HHS-HCC): Primary | ICD-10-CM

## 2025-06-24 DIAGNOSIS — Q27.0 SINGLE UMBILICAL ARTERY (HHS-HCC): ICD-10-CM

## 2025-06-24 DIAGNOSIS — Z36.89 NON-STRESS TEST REACTIVE (HHS-HCC): ICD-10-CM

## 2025-06-24 DIAGNOSIS — O16.9 ELEVATED BLOOD PRESSURE AFFECTING PREGNANCY, ANTEPARTUM: ICD-10-CM

## 2025-06-24 DIAGNOSIS — O36.5990 FETAL GROWTH RESTRICTION ANTEPARTUM (HHS-HCC): ICD-10-CM

## 2025-06-24 DIAGNOSIS — O99.719 PRURITUS OF PREGNANCY, ANTEPARTUM (HHS-HCC): ICD-10-CM

## 2025-06-24 DIAGNOSIS — O99.210 OBESITY IN PREGNANCY (HHS-HCC): ICD-10-CM

## 2025-06-24 DIAGNOSIS — Z34.90 ENCOUNTER FOR INDUCTION OF LABOR: Primary | ICD-10-CM

## 2025-06-24 LAB
ALBUMIN SERPL BCP-MCNC: 3.3 G/DL (ref 3.4–5)
ALP SERPL-CCNC: 100 U/L (ref 33–110)
ALT SERPL W P-5'-P-CCNC: 9 U/L (ref 7–45)
ANION GAP SERPL CALC-SCNC: 15 MMOL/L (ref 10–20)
AST SERPL W P-5'-P-CCNC: 17 U/L (ref 9–39)
BILIRUB SERPL-MCNC: 0.3 MG/DL (ref 0–1.2)
BUN SERPL-MCNC: 6 MG/DL (ref 6–23)
CALCIUM SERPL-MCNC: 8.4 MG/DL (ref 8.6–10.6)
CHLORIDE SERPL-SCNC: 102 MMOL/L (ref 98–107)
CO2 SERPL-SCNC: 21 MMOL/L (ref 21–32)
CREAT SERPL-MCNC: 0.51 MG/DL (ref 0.5–1.05)
EGFRCR SERPLBLD CKD-EPI 2021: >90 ML/MIN/1.73M*2
ERYTHROCYTE [DISTWIDTH] IN BLOOD BY AUTOMATED COUNT: 13.2 % (ref 11.5–14.5)
GLUCOSE SERPL-MCNC: 105 MG/DL (ref 74–99)
HCT VFR BLD AUTO: 32 % (ref 36–46)
HGB BLD-MCNC: 10.3 G/DL (ref 12–16)
LDH SERPL L TO P-CCNC: 215 U/L (ref 84–246)
MCH RBC QN AUTO: 27.8 PG (ref 26–34)
MCHC RBC AUTO-ENTMCNC: 32.2 G/DL (ref 32–36)
MCV RBC AUTO: 86 FL (ref 80–100)
NRBC BLD-RTO: 0 /100 WBCS (ref 0–0)
PLATELET # BLD AUTO: 292 X10*3/UL (ref 150–450)
POTASSIUM SERPL-SCNC: 3.6 MMOL/L (ref 3.5–5.3)
PROT SERPL-MCNC: 6.2 G/DL (ref 6.4–8.2)
RBC # BLD AUTO: 3.71 X10*6/UL (ref 4–5.2)
SODIUM SERPL-SCNC: 134 MMOL/L (ref 136–145)
TREPONEMA PALLIDUM IGG+IGM AB [PRESENCE] IN SERUM OR PLASMA BY IMMUNOASSAY: NONREACTIVE
URATE SERPL-MCNC: 4.4 MG/DL (ref 2.3–6.7)
WBC # BLD AUTO: 9.3 X10*3/UL (ref 4.4–11.3)

## 2025-06-24 PROCEDURE — 59050 FETAL MONITOR W/REPORT: CPT

## 2025-06-24 PROCEDURE — 83615 LACTATE (LD) (LDH) ENZYME: CPT

## 2025-06-24 PROCEDURE — 86923 COMPATIBILITY TEST ELECTRIC: CPT

## 2025-06-24 PROCEDURE — 59025 FETAL NON-STRESS TEST: CPT

## 2025-06-24 PROCEDURE — 36415 COLL VENOUS BLD VENIPUNCTURE: CPT

## 2025-06-24 PROCEDURE — 0501F PRENATAL FLOW SHEET: CPT

## 2025-06-24 PROCEDURE — 99199 UNLISTED SPECIAL SVC PX/RPRT: CPT

## 2025-06-24 PROCEDURE — 2500000004 HC RX 250 GENERAL PHARMACY W/ HCPCS (ALT 636 FOR OP/ED)

## 2025-06-24 PROCEDURE — 80053 COMPREHEN METABOLIC PANEL: CPT

## 2025-06-24 PROCEDURE — 7210000002 HC LABOR PER HOUR

## 2025-06-24 PROCEDURE — 85027 COMPLETE CBC AUTOMATED: CPT

## 2025-06-24 PROCEDURE — 86901 BLOOD TYPING SEROLOGIC RH(D): CPT

## 2025-06-24 PROCEDURE — 1120000001 HC OB PRIVATE ROOM DAILY

## 2025-06-24 PROCEDURE — 0U7C7DJ DILATION OF CERVIX WITH INTRALUM DEV, TEMP, VIA OPENING: ICD-10-PCS | Performed by: STUDENT IN AN ORGANIZED HEALTH CARE EDUCATION/TRAINING PROGRAM

## 2025-06-24 PROCEDURE — 86900 BLOOD TYPING SEROLOGIC ABO: CPT

## 2025-06-24 PROCEDURE — 84550 ASSAY OF BLOOD/URIC ACID: CPT

## 2025-06-24 PROCEDURE — 86780 TREPONEMA PALLIDUM: CPT

## 2025-06-24 RX ORDER — LOPERAMIDE HYDROCHLORIDE 2 MG/1
4 CAPSULE ORAL EVERY 2 HOUR PRN
Status: DISCONTINUED | OUTPATIENT
Start: 2025-06-24 | End: 2025-06-25

## 2025-06-24 RX ORDER — NALBUPHINE HYDROCHLORIDE 10 MG/ML
10 INJECTION INTRAMUSCULAR; INTRAVENOUS; SUBCUTANEOUS ONCE
Status: COMPLETED | OUTPATIENT
Start: 2025-06-24 | End: 2025-06-24

## 2025-06-24 RX ORDER — METHYLERGONOVINE MALEATE 0.2 MG/ML
0.2 INJECTION INTRAVENOUS ONCE AS NEEDED
Status: DISCONTINUED | OUTPATIENT
Start: 2025-06-24 | End: 2025-06-25

## 2025-06-24 RX ORDER — ONDANSETRON HYDROCHLORIDE 2 MG/ML
4 INJECTION, SOLUTION INTRAVENOUS EVERY 6 HOURS PRN
Status: DISCONTINUED | OUTPATIENT
Start: 2025-06-24 | End: 2025-06-25

## 2025-06-24 RX ORDER — LABETALOL HYDROCHLORIDE 5 MG/ML
20 INJECTION, SOLUTION INTRAVENOUS ONCE AS NEEDED
Status: DISCONTINUED | OUTPATIENT
Start: 2025-06-24 | End: 2025-06-25

## 2025-06-24 RX ORDER — CARBOPROST TROMETHAMINE 250 UG/ML
250 INJECTION, SOLUTION INTRAMUSCULAR ONCE AS NEEDED
Status: DISCONTINUED | OUTPATIENT
Start: 2025-06-24 | End: 2025-06-25

## 2025-06-24 RX ORDER — SODIUM CHLORIDE, SODIUM LACTATE, POTASSIUM CHLORIDE, CALCIUM CHLORIDE 600; 310; 30; 20 MG/100ML; MG/100ML; MG/100ML; MG/100ML
75 INJECTION, SOLUTION INTRAVENOUS CONTINUOUS
Status: DISCONTINUED | OUTPATIENT
Start: 2025-06-24 | End: 2025-06-25

## 2025-06-24 RX ORDER — LIDOCAINE HYDROCHLORIDE 10 MG/ML
20 INJECTION, SOLUTION INFILTRATION; PERINEURAL ONCE AS NEEDED
Status: DISCONTINUED | OUTPATIENT
Start: 2025-06-24 | End: 2025-06-25

## 2025-06-24 RX ORDER — OXYTOCIN/0.9 % SODIUM CHLORIDE 30/500 ML
2-30 PLASTIC BAG, INJECTION (ML) INTRAVENOUS CONTINUOUS
Status: DISCONTINUED | OUTPATIENT
Start: 2025-06-24 | End: 2025-06-27 | Stop reason: HOSPADM

## 2025-06-24 RX ORDER — TRANEXAMIC ACID 1 G/10ML
1000 INJECTION, SOLUTION INTRAVENOUS ONCE AS NEEDED
Status: DISCONTINUED | OUTPATIENT
Start: 2025-06-24 | End: 2025-06-25

## 2025-06-24 RX ORDER — OXYTOCIN 10 [USP'U]/ML
10 INJECTION, SOLUTION INTRAMUSCULAR; INTRAVENOUS ONCE AS NEEDED
Status: DISCONTINUED | OUTPATIENT
Start: 2025-06-24 | End: 2025-06-25

## 2025-06-24 RX ORDER — MISOPROSTOL 200 UG/1
800 TABLET ORAL ONCE AS NEEDED
Status: DISCONTINUED | OUTPATIENT
Start: 2025-06-24 | End: 2025-06-25

## 2025-06-24 RX ORDER — HYDRALAZINE HYDROCHLORIDE 20 MG/ML
5 INJECTION INTRAMUSCULAR; INTRAVENOUS ONCE AS NEEDED
Status: DISCONTINUED | OUTPATIENT
Start: 2025-06-24 | End: 2025-06-25

## 2025-06-24 RX ORDER — MAGNESIUM SULFATE HEPTAHYDRATE 40 MG/ML
2 INJECTION, SOLUTION INTRAVENOUS CONTINUOUS
Status: DISCONTINUED | OUTPATIENT
Start: 2025-06-24 | End: 2025-06-27 | Stop reason: HOSPADM

## 2025-06-24 RX ORDER — ONDANSETRON 4 MG/1
4 TABLET, FILM COATED ORAL EVERY 6 HOURS PRN
Status: DISCONTINUED | OUTPATIENT
Start: 2025-06-24 | End: 2025-06-25

## 2025-06-24 RX ORDER — OXYTOCIN/0.9 % SODIUM CHLORIDE 30/500 ML
60 PLASTIC BAG, INJECTION (ML) INTRAVENOUS ONCE AS NEEDED
Status: DISCONTINUED | OUTPATIENT
Start: 2025-06-24 | End: 2025-06-25

## 2025-06-24 RX ORDER — CALCIUM CARBONATE 200(500)MG
1 TABLET,CHEWABLE ORAL EVERY 6 HOURS PRN
Status: DISCONTINUED | OUTPATIENT
Start: 2025-06-24 | End: 2025-06-25

## 2025-06-24 RX ORDER — OXYTOCIN/0.9 % SODIUM CHLORIDE 30/500 ML
60 PLASTIC BAG, INJECTION (ML) INTRAVENOUS ONCE AS NEEDED
Status: COMPLETED | OUTPATIENT
Start: 2025-06-24 | End: 2025-06-25

## 2025-06-24 RX ORDER — CALCIUM GLUCONATE 98 MG/ML
1 INJECTION, SOLUTION INTRAVENOUS ONCE AS NEEDED
Status: DISCONTINUED | OUTPATIENT
Start: 2025-06-24 | End: 2025-06-27 | Stop reason: HOSPADM

## 2025-06-24 RX ORDER — TERBUTALINE SULFATE 1 MG/ML
0.25 INJECTION SUBCUTANEOUS ONCE AS NEEDED
Status: DISCONTINUED | OUTPATIENT
Start: 2025-06-24 | End: 2025-06-25

## 2025-06-24 RX ADMIN — MAGNESIUM SULFATE HEPTAHYDRATE 2 G/HR: 40 INJECTION, SOLUTION INTRAVENOUS at 21:04

## 2025-06-24 RX ADMIN — NALBUPHINE HYDROCHLORIDE 10 MG: 10 INJECTION, SOLUTION INTRAMUSCULAR; INTRAVENOUS; SUBCUTANEOUS at 21:57

## 2025-06-24 RX ADMIN — SODIUM CHLORIDE, SODIUM LACTATE, POTASSIUM CHLORIDE, AND CALCIUM CHLORIDE 75 ML/HR: .6; .31; .03; .02 INJECTION, SOLUTION INTRAVENOUS at 20:44

## 2025-06-24 SDOH — HEALTH STABILITY: MENTAL HEALTH: SUICIDAL BEHAVIOR (LIFETIME): NO

## 2025-06-24 SDOH — ECONOMIC STABILITY: FOOD INSECURITY: WITHIN THE PAST 12 MONTHS, THE FOOD YOU BOUGHT JUST DIDN'T LAST AND YOU DIDN'T HAVE MONEY TO GET MORE.: NEVER TRUE

## 2025-06-24 SDOH — SOCIAL STABILITY: SOCIAL INSECURITY
WITHIN THE LAST YEAR, HAVE YOU BEEN RAPED OR FORCED TO HAVE ANY KIND OF SEXUAL ACTIVITY BY YOUR PARTNER OR EX-PARTNER?: NO

## 2025-06-24 SDOH — SOCIAL STABILITY: SOCIAL INSECURITY: PHYSICAL ABUSE: DENIES

## 2025-06-24 SDOH — SOCIAL STABILITY: SOCIAL INSECURITY
WITHIN THE LAST YEAR, HAVE YOU BEEN KICKED, HIT, SLAPPED, OR OTHERWISE PHYSICALLY HURT BY YOUR PARTNER OR EX-PARTNER?: NO

## 2025-06-24 SDOH — SOCIAL STABILITY: SOCIAL INSECURITY: WITHIN THE LAST YEAR, HAVE YOU BEEN AFRAID OF YOUR PARTNER OR EX-PARTNER?: NO

## 2025-06-24 SDOH — HEALTH STABILITY: MENTAL HEALTH: WISH TO BE DEAD (PAST 1 MONTH): NO

## 2025-06-24 SDOH — HEALTH STABILITY: MENTAL HEALTH: NON-SPECIFIC ACTIVE SUICIDAL THOUGHTS (PAST 1 MONTH): NO

## 2025-06-24 SDOH — SOCIAL STABILITY: SOCIAL INSECURITY: WITHIN THE LAST YEAR, HAVE YOU BEEN HUMILIATED OR EMOTIONALLY ABUSED IN OTHER WAYS BY YOUR PARTNER OR EX-PARTNER?: NO

## 2025-06-24 SDOH — SOCIAL STABILITY: SOCIAL INSECURITY: VERBAL ABUSE: DENIES

## 2025-06-24 SDOH — ECONOMIC STABILITY: FOOD INSECURITY: WITHIN THE PAST 12 MONTHS, YOU WORRIED THAT YOUR FOOD WOULD RUN OUT BEFORE YOU GOT THE MONEY TO BUY MORE.: NEVER TRUE

## 2025-06-24 SDOH — SOCIAL STABILITY: SOCIAL INSECURITY: ABUSE SCREEN: ADULT

## 2025-06-24 SDOH — SOCIAL STABILITY: SOCIAL INSECURITY: ARE YOU OR HAVE YOU BEEN THREATENED OR ABUSED PHYSICALLY, EMOTIONALLY, OR SEXUALLY BY ANYONE?: NO

## 2025-06-24 SDOH — SOCIAL STABILITY: SOCIAL INSECURITY: HAS ANYONE EVER THREATENED TO HURT YOUR FAMILY OR YOUR PETS?: NO

## 2025-06-24 SDOH — SOCIAL STABILITY: SOCIAL INSECURITY: DO YOU FEEL ANYONE HAS EXPLOITED OR TAKEN ADVANTAGE OF YOU FINANCIALLY OR OF YOUR PERSONAL PROPERTY?: NO

## 2025-06-24 SDOH — HEALTH STABILITY: MENTAL HEALTH: WERE YOU ABLE TO COMPLETE ALL THE BEHAVIORAL HEALTH SCREENINGS?: YES

## 2025-06-24 SDOH — SOCIAL STABILITY: SOCIAL INSECURITY: DOES ANYONE TRY TO KEEP YOU FROM HAVING/CONTACTING OTHER FRIENDS OR DOING THINGS OUTSIDE YOUR HOME?: NO

## 2025-06-24 SDOH — SOCIAL STABILITY: SOCIAL INSECURITY: HAVE YOU HAD THOUGHTS OF HARMING ANYONE ELSE?: NO

## 2025-06-24 SDOH — SOCIAL STABILITY: SOCIAL INSECURITY: ARE THERE ANY APPARENT SIGNS OF INJURIES/BEHAVIORS THAT COULD BE RELATED TO ABUSE/NEGLECT?: NO

## 2025-06-24 SDOH — ECONOMIC STABILITY: HOUSING INSECURITY: DO YOU FEEL UNSAFE GOING BACK TO THE PLACE WHERE YOU ARE LIVING?: NO

## 2025-06-24 ASSESSMENT — LIFESTYLE VARIABLES
HOW OFTEN DO YOU HAVE A DRINK CONTAINING ALCOHOL: NEVER
AUDIT-C TOTAL SCORE: 0
AUDIT-C TOTAL SCORE: 0
SKIP TO QUESTIONS 9-10: 1
HOW MANY STANDARD DRINKS CONTAINING ALCOHOL DO YOU HAVE ON A TYPICAL DAY: PATIENT DOES NOT DRINK
HOW OFTEN DO YOU HAVE 6 OR MORE DRINKS ON ONE OCCASION: NEVER

## 2025-06-24 ASSESSMENT — PATIENT HEALTH QUESTIONNAIRE - PHQ9
2. FEELING DOWN, DEPRESSED OR HOPELESS: NOT AT ALL
1. LITTLE INTEREST OR PLEASURE IN DOING THINGS: NOT AT ALL
SUM OF ALL RESPONSES TO PHQ9 QUESTIONS 1 & 2: 0

## 2025-06-24 ASSESSMENT — PAIN SCALES - GENERAL
PAINLEVEL_OUTOF10: 0 - NO PAIN
PAINLEVEL_OUTOF10: 0 - NO PAIN

## 2025-06-24 ASSESSMENT — ACTIVITIES OF DAILY LIVING (ADL): LACK_OF_TRANSPORTATION: NO

## 2025-06-24 NOTE — PROGRESS NOTES
"Ob Visit  25     SUBJECTIVE      HPI: Leighann Lu \"Jase" is a 35 y.o.  at 37w3d here for RPNV.  She has occasional contractions, no bleeding, or LOF. Reports normal fetal movement.      Bps at home have been 107-138/70-91.   She c/o a headache that lasted several hours last night but is not currently present. She denies visual disturbances. Heartburn has been intermittent, but no current epigastric pain.     OBJECTIVE  Visit Vitals  BP (!) 160/100   Wt 112 kg (247 lb 3.2 oz)   LMP 10/12/2024   BMI 42.43 kg/m²   OB Status Pregnant   Smoking Status Never   BSA 2.25 m²      Blood pressure today was 160/100 and 160/108 (done manually by this provider with a large cuff) on recheck >15 min later while on NST.     Non-Stress Test   Baseline Fetal Heart Rate for Non-Stress Test: 140 BPM  Variability in Waveform for Non-Stress Test: Moderate  Accelerations in Non-Stress Test: Yes, lasting at least 15 seconds, greater than/equal to 15 bpm  Decelerations in Non-Stress Test: None  Contractions in Non-Stress Test: Not present  Interpretation of Non-Stress Test   Interpretation of Non-Stress Test: Reactive                              ASSESSMENT & PLAN    Leighann Lu \"Jase" is a 35 y.o.  at 37w3d here for the following concerns we addressed today:    Problem List Items Addressed This Visit       37 weeks gestation of pregnancy ([facility]) - Primary    Overview   Desired provider in labor: [x] CNM  [] Physician  [x] Blood Products: [x] Yes, accepts [] No, needs counseling  [x] Initial BMI: 35.34   [x] Prenatal Labs:   [x] Cervical Cancer Screening up to date  -  result pending   [x] Rh status: pos  [x] Genetic Screening:  ccfDNA  neg - its a girl!  [x] NT US: (11-13 wks)  [x] Baby ASA (if indicated): pt is taking   [x] Pregnancy dated by: 7.4 week US     [x] Anatomy US: (19-20 wks)  [] Federal Sterilization consent signed (if indicated):  [x] 1hr GCT at 24-28wks:  [] Rhogam (if indicated): "   [] Fetal Surveillance (if indicated):  [x] Tdap (27-32 wks, may be given up to 36 wks if initial window missed): 4/22  [] RSV (32-36 wks) (Sept. to end of Jan):   [] Flu Vaccine:    [x] Breastfeeding: pumpimg, and bottle feeding; pump on hand already  [x] Postpartum Birth control method: does not want more kids - considering tubal   [x] GBS at 36 - 37 wks: talked about completing at next visit  [] 39 weeks discussion of IOL vs. Expectant management:  [] Mode of delivery ( anticipated ):          Elevated blood pressure affecting pregnancy, antepartum    Overview   Provided instructions for checking at home twice daily, log sheet given, warning signs reviewed. Baseline HELLP labs ordered 6/6           Fetal growth restriction antepartum ([facility])    Overview   5/5 - 30w2d - Decreased interval fetal growth. The Ac is at the 9%, and the EFW is at the 7% percentile. repeat doppler, BPP and AFV evaluation scheduled weekly.   Added to case review for may     5/27/2025-BP score 8/8              Obesity in pregnancy ([facility])    Overview   Growth scan 30 and 36 weeks, BPP or NST weekly 37 weeks to delivery, and recommended delivery 39 0/7 - 39 6/7   Starting BMI 35         Pruritus of pregnancy, antepartum ([facility])    Overview   Patient reports itching on hands and feet that has been getting worse and areas of itching on her body. Labs ordered 3/11 - neg          Single umbilical artery ([facility])    Supervision of other normal pregnancy, antepartum ([facility])       Discussed with Dr Ceja at [place] - Pt instructed to proceed to L&D at [facility] [place] [place] for IOL in the setting of HDP/PEC. Discussed with pt given sustained severe range BP in the office, it is my advice that she is taken to the hospital by squad where she can be monitored and transferred quickly and safely. Pt declines us calling squad from office and would like to drive her daughter home where she plans to proceed directly to [facility] [place] [place] with a support person.   I dicussed my  concern for the safety of her and her baby given sustained severe range BP today and her increased risk for an eclamptic event. Pt verbalized understanding and again declines us calling the squad and would like to drive herself against medical advice.     Information for [facility] provided to patient and Dr Cavanaugh made aware.       Dorothy Edouard, MARTY-ELEANOR

## 2025-06-24 NOTE — CARE PLAN
The patient's goals for the shift include      The clinical goals for the shift include bp <160/110

## 2025-06-25 ENCOUNTER — ANESTHESIA (OUTPATIENT)
Dept: POSTPARTUM | Facility: HOSPITAL | Age: 35
End: 2025-06-25
Payer: COMMERCIAL

## 2025-06-25 ENCOUNTER — ANESTHESIA EVENT (OUTPATIENT)
Dept: POSTPARTUM | Facility: HOSPITAL | Age: 35
End: 2025-06-25
Payer: COMMERCIAL

## 2025-06-25 LAB
ABO GROUP (TYPE) IN BLOOD: NORMAL
ALBUMIN SERPL BCP-MCNC: 3.2 G/DL (ref 3.4–5)
ALP SERPL-CCNC: 97 U/L (ref 33–110)
ALT SERPL W P-5'-P-CCNC: 8 U/L (ref 7–45)
ANION GAP SERPL CALC-SCNC: 13 MMOL/L (ref 10–20)
ANTIBODY SCREEN: NORMAL
AST SERPL W P-5'-P-CCNC: 15 U/L (ref 9–39)
BILIRUB SERPL-MCNC: 0.2 MG/DL (ref 0–1.2)
BUN SERPL-MCNC: 6 MG/DL (ref 6–23)
CALCIUM SERPL-MCNC: 7.3 MG/DL (ref 8.6–10.6)
CHLORIDE SERPL-SCNC: 101 MMOL/L (ref 98–107)
CO2 SERPL-SCNC: 22 MMOL/L (ref 21–32)
CREAT SERPL-MCNC: 0.53 MG/DL (ref 0.5–1.05)
EGFRCR SERPLBLD CKD-EPI 2021: >90 ML/MIN/1.73M*2
ERYTHROCYTE [DISTWIDTH] IN BLOOD BY AUTOMATED COUNT: 13.1 % (ref 11.5–14.5)
GLUCOSE SERPL-MCNC: 139 MG/DL (ref 74–99)
HCT VFR BLD AUTO: 31.6 % (ref 36–46)
HGB BLD-MCNC: 9.9 G/DL (ref 12–16)
MCH RBC QN AUTO: 27.3 PG (ref 26–34)
MCHC RBC AUTO-ENTMCNC: 31.3 G/DL (ref 32–36)
MCV RBC AUTO: 87 FL (ref 80–100)
NRBC BLD-RTO: 0 /100 WBCS (ref 0–0)
PLATELET # BLD AUTO: 243 X10*3/UL (ref 150–450)
POTASSIUM SERPL-SCNC: 3.4 MMOL/L (ref 3.5–5.3)
PROT SERPL-MCNC: 6 G/DL (ref 6.4–8.2)
RBC # BLD AUTO: 3.63 X10*6/UL (ref 4–5.2)
RH FACTOR (ANTIGEN D): NORMAL
SODIUM SERPL-SCNC: 133 MMOL/L (ref 136–145)
WBC # BLD AUTO: 9.7 X10*3/UL (ref 4.4–11.3)

## 2025-06-25 PROCEDURE — 2500000001 HC RX 250 WO HCPCS SELF ADMINISTERED DRUGS (ALT 637 FOR MEDICARE OP)

## 2025-06-25 PROCEDURE — 7210000002 HC LABOR PER HOUR

## 2025-06-25 PROCEDURE — 59409 OBSTETRICAL CARE: CPT | Performed by: OBSTETRICS & GYNECOLOGY

## 2025-06-25 PROCEDURE — 99199 UNLISTED SPECIAL SVC PX/RPRT: CPT

## 2025-06-25 PROCEDURE — 59410 OBSTETRICAL CARE: CPT | Performed by: OBSTETRICS & GYNECOLOGY

## 2025-06-25 PROCEDURE — 3E033VJ INTRODUCTION OF OTHER HORMONE INTO PERIPHERAL VEIN, PERCUTANEOUS APPROACH: ICD-10-PCS | Performed by: STUDENT IN AN ORGANIZED HEALTH CARE EDUCATION/TRAINING PROGRAM

## 2025-06-25 PROCEDURE — 80053 COMPREHEN METABOLIC PANEL: CPT

## 2025-06-25 PROCEDURE — 85027 COMPLETE CBC AUTOMATED: CPT

## 2025-06-25 PROCEDURE — 2500000004 HC RX 250 GENERAL PHARMACY W/ HCPCS (ALT 636 FOR OP/ED)

## 2025-06-25 PROCEDURE — 2500000001 HC RX 250 WO HCPCS SELF ADMINISTERED DRUGS (ALT 637 FOR MEDICARE OP): Performed by: OBSTETRICS & GYNECOLOGY

## 2025-06-25 PROCEDURE — 2500000004 HC RX 250 GENERAL PHARMACY W/ HCPCS (ALT 636 FOR OP/ED): Mod: JW

## 2025-06-25 PROCEDURE — 3700000014 EPIDURAL BLOCK: Mod: GC

## 2025-06-25 PROCEDURE — 1210000001 HC SEMI-PRIVATE ROOM DAILY

## 2025-06-25 PROCEDURE — 36415 COLL VENOUS BLD VENIPUNCTURE: CPT

## 2025-06-25 PROCEDURE — 2500000002 HC RX 250 W HCPCS SELF ADMINISTERED DRUGS (ALT 637 FOR MEDICARE OP, ALT 636 FOR OP/ED): Performed by: OBSTETRICS & GYNECOLOGY

## 2025-06-25 RX ORDER — LOPERAMIDE HYDROCHLORIDE 2 MG/1
4 CAPSULE ORAL EVERY 2 HOUR PRN
Status: DISCONTINUED | OUTPATIENT
Start: 2025-06-25 | End: 2025-06-27 | Stop reason: HOSPADM

## 2025-06-25 RX ORDER — PHENYLEPHRINE HCL IN 0.9% NACL 0.4MG/10ML
SYRINGE (ML) INTRAVENOUS AS NEEDED
Status: DISCONTINUED | OUTPATIENT
Start: 2025-06-25 | End: 2025-06-27

## 2025-06-25 RX ORDER — ACETAMINOPHEN 325 MG/1
975 TABLET ORAL ONCE
Status: COMPLETED | OUTPATIENT
Start: 2025-06-25 | End: 2025-06-25

## 2025-06-25 RX ORDER — ACETAMINOPHEN 325 MG/1
975 TABLET ORAL EVERY 6 HOURS
Status: DISCONTINUED | OUTPATIENT
Start: 2025-06-25 | End: 2025-06-27 | Stop reason: HOSPADM

## 2025-06-25 RX ORDER — DIPHENHYDRAMINE HCL 25 MG
25 CAPSULE ORAL EVERY 6 HOURS PRN
Status: DISCONTINUED | OUTPATIENT
Start: 2025-06-25 | End: 2025-06-27 | Stop reason: HOSPADM

## 2025-06-25 RX ORDER — ADHESIVE BANDAGE
10 BANDAGE TOPICAL
Status: DISCONTINUED | OUTPATIENT
Start: 2025-06-25 | End: 2025-06-27 | Stop reason: HOSPADM

## 2025-06-25 RX ORDER — FENTANYL/ROPIVACAINE/NS/PF 2MCG/ML-.2
0-25 PLASTIC BAG, INJECTION (ML) INJECTION CONTINUOUS
Refills: 0 | Status: DISCONTINUED | OUTPATIENT
Start: 2025-06-25 | End: 2025-06-27 | Stop reason: HOSPADM

## 2025-06-25 RX ORDER — OXYTOCIN/0.9 % SODIUM CHLORIDE 30/500 ML
60 PLASTIC BAG, INJECTION (ML) INTRAVENOUS ONCE AS NEEDED
Status: DISCONTINUED | OUTPATIENT
Start: 2025-06-25 | End: 2025-06-27 | Stop reason: HOSPADM

## 2025-06-25 RX ORDER — DIPHENHYDRAMINE HYDROCHLORIDE 50 MG/ML
25 INJECTION, SOLUTION INTRAMUSCULAR; INTRAVENOUS ONCE
Status: COMPLETED | OUTPATIENT
Start: 2025-06-25 | End: 2025-06-25

## 2025-06-25 RX ORDER — METOCLOPRAMIDE 10 MG/1
10 TABLET ORAL EVERY 6 HOURS PRN
Status: DISCONTINUED | OUTPATIENT
Start: 2025-06-25 | End: 2025-06-27 | Stop reason: HOSPADM

## 2025-06-25 RX ORDER — POTASSIUM CHLORIDE 20 MEQ/1
20 TABLET, EXTENDED RELEASE ORAL ONCE
Status: COMPLETED | OUTPATIENT
Start: 2025-06-25 | End: 2025-06-25

## 2025-06-25 RX ORDER — CARBOPROST TROMETHAMINE 250 UG/ML
250 INJECTION, SOLUTION INTRAMUSCULAR ONCE AS NEEDED
Status: DISCONTINUED | OUTPATIENT
Start: 2025-06-25 | End: 2025-06-27 | Stop reason: HOSPADM

## 2025-06-25 RX ORDER — METHYLERGONOVINE MALEATE 0.2 MG/ML
0.2 INJECTION INTRAVENOUS ONCE AS NEEDED
Status: DISCONTINUED | OUTPATIENT
Start: 2025-06-25 | End: 2025-06-27 | Stop reason: HOSPADM

## 2025-06-25 RX ORDER — DIPHENHYDRAMINE HYDROCHLORIDE 50 MG/ML
25 INJECTION, SOLUTION INTRAMUSCULAR; INTRAVENOUS EVERY 6 HOURS PRN
Status: DISCONTINUED | OUTPATIENT
Start: 2025-06-25 | End: 2025-06-27 | Stop reason: HOSPADM

## 2025-06-25 RX ORDER — IBUPROFEN 600 MG/1
600 TABLET, FILM COATED ORAL EVERY 6 HOURS
Status: DISCONTINUED | OUTPATIENT
Start: 2025-06-25 | End: 2025-06-27 | Stop reason: HOSPADM

## 2025-06-25 RX ORDER — HYDRALAZINE HYDROCHLORIDE 20 MG/ML
5 INJECTION INTRAMUSCULAR; INTRAVENOUS ONCE AS NEEDED
Status: DISCONTINUED | OUTPATIENT
Start: 2025-06-25 | End: 2025-06-27 | Stop reason: HOSPADM

## 2025-06-25 RX ORDER — ONDANSETRON HYDROCHLORIDE 2 MG/ML
4 INJECTION, SOLUTION INTRAVENOUS EVERY 6 HOURS PRN
Status: DISCONTINUED | OUTPATIENT
Start: 2025-06-25 | End: 2025-06-27 | Stop reason: HOSPADM

## 2025-06-25 RX ORDER — LIDOCAINE HCL/EPINEPHRINE/PF 2%-1:200K
VIAL (ML) INJECTION AS NEEDED
Status: DISCONTINUED | OUTPATIENT
Start: 2025-06-25 | End: 2025-06-27

## 2025-06-25 RX ORDER — LABETALOL HYDROCHLORIDE 5 MG/ML
20 INJECTION, SOLUTION INTRAVENOUS ONCE AS NEEDED
Status: DISCONTINUED | OUTPATIENT
Start: 2025-06-25 | End: 2025-06-27 | Stop reason: HOSPADM

## 2025-06-25 RX ORDER — LIDOCAINE HYDROCHLORIDE AND EPINEPHRINE 10; 10 UG/ML; MG/ML
INJECTION, SOLUTION INFILTRATION; PERINEURAL AS NEEDED
Status: DISCONTINUED | OUTPATIENT
Start: 2025-06-25 | End: 2025-06-27

## 2025-06-25 RX ORDER — MISOPROSTOL 200 UG/1
800 TABLET ORAL ONCE AS NEEDED
Status: DISCONTINUED | OUTPATIENT
Start: 2025-06-25 | End: 2025-06-27 | Stop reason: HOSPADM

## 2025-06-25 RX ORDER — OXYTOCIN 10 [USP'U]/ML
10 INJECTION, SOLUTION INTRAMUSCULAR; INTRAVENOUS ONCE AS NEEDED
Status: DISCONTINUED | OUTPATIENT
Start: 2025-06-25 | End: 2025-06-27 | Stop reason: HOSPADM

## 2025-06-25 RX ORDER — METOCLOPRAMIDE HYDROCHLORIDE 5 MG/ML
10 INJECTION INTRAMUSCULAR; INTRAVENOUS EVERY 6 HOURS PRN
Status: DISCONTINUED | OUTPATIENT
Start: 2025-06-25 | End: 2025-06-27 | Stop reason: HOSPADM

## 2025-06-25 RX ORDER — POLYETHYLENE GLYCOL 3350 17 G/17G
17 POWDER, FOR SOLUTION ORAL 2 TIMES DAILY PRN
Status: DISCONTINUED | OUTPATIENT
Start: 2025-06-25 | End: 2025-06-27 | Stop reason: HOSPADM

## 2025-06-25 RX ORDER — SODIUM CHLORIDE, SODIUM LACTATE, POTASSIUM CHLORIDE, CALCIUM CHLORIDE 600; 310; 30; 20 MG/100ML; MG/100ML; MG/100ML; MG/100ML
75 INJECTION, SOLUTION INTRAVENOUS CONTINUOUS
Status: ACTIVE | OUTPATIENT
Start: 2025-06-25 | End: 2025-06-27

## 2025-06-25 RX ORDER — SIMETHICONE 80 MG
80 TABLET,CHEWABLE ORAL 4 TIMES DAILY PRN
Status: DISCONTINUED | OUTPATIENT
Start: 2025-06-25 | End: 2025-06-27 | Stop reason: HOSPADM

## 2025-06-25 RX ORDER — ONDANSETRON 4 MG/1
4 TABLET, FILM COATED ORAL EVERY 6 HOURS PRN
Status: DISCONTINUED | OUTPATIENT
Start: 2025-06-25 | End: 2025-06-27 | Stop reason: HOSPADM

## 2025-06-25 RX ORDER — TRANEXAMIC ACID 1 G/10ML
1000 INJECTION, SOLUTION INTRAVENOUS ONCE AS NEEDED
Status: DISCONTINUED | OUTPATIENT
Start: 2025-06-25 | End: 2025-06-27 | Stop reason: HOSPADM

## 2025-06-25 RX ADMIN — IBUPROFEN 600 MG: 600 TABLET ORAL at 12:13

## 2025-06-25 RX ADMIN — POTASSIUM CHLORIDE 20 MEQ: 1500 TABLET, EXTENDED RELEASE ORAL at 12:13

## 2025-06-25 RX ADMIN — Medication 2 MILLI-UNITS/MIN: at 01:34

## 2025-06-25 RX ADMIN — ACETAMINOPHEN 975 MG: 325 TABLET ORAL at 12:13

## 2025-06-25 RX ADMIN — SODIUM CHLORIDE, SODIUM LACTATE, POTASSIUM CHLORIDE, AND CALCIUM CHLORIDE 500 ML: .6; .31; .03; .02 INJECTION, SOLUTION INTRAVENOUS at 02:34

## 2025-06-25 RX ADMIN — ACETAMINOPHEN 975 MG: 325 TABLET ORAL at 02:29

## 2025-06-25 RX ADMIN — SODIUM CHLORIDE, SODIUM LACTATE, POTASSIUM CHLORIDE, AND CALCIUM CHLORIDE 75 ML/HR: .6; .31; .03; .02 INJECTION, SOLUTION INTRAVENOUS at 12:15

## 2025-06-25 RX ADMIN — DIPHENHYDRAMINE HYDROCHLORIDE 25 MG: 50 INJECTION INTRAMUSCULAR; INTRAVENOUS at 04:22

## 2025-06-25 RX ADMIN — Medication 120 MCG: at 06:45

## 2025-06-25 RX ADMIN — METOCLOPRAMIDE 10 MG: 5 INJECTION, SOLUTION INTRAMUSCULAR; INTRAVENOUS at 04:23

## 2025-06-25 RX ADMIN — Medication 5 ML: at 06:33

## 2025-06-25 RX ADMIN — LIDOCAINE HYDROCHLORIDE AND EPINEPHRINE 3 ML: 10; 10 INJECTION, SOLUTION INFILTRATION; PERINEURAL at 09:17

## 2025-06-25 RX ADMIN — LIDOCAINE HYDROCHLORIDE AND EPINEPHRINE 5 ML: 10; 10 INJECTION, SOLUTION INFILTRATION; PERINEURAL at 09:05

## 2025-06-25 RX ADMIN — MAGNESIUM SULFATE HEPTAHYDRATE 2 G/HR: 40 INJECTION, SOLUTION INTRAVENOUS at 12:15

## 2025-06-25 RX ADMIN — Medication 5 ML: at 08:16

## 2025-06-25 RX ADMIN — IBUPROFEN 600 MG: 600 TABLET ORAL at 18:29

## 2025-06-25 RX ADMIN — Medication 10 ML/HR: at 06:35

## 2025-06-25 RX ADMIN — MAGNESIUM SULFATE HEPTAHYDRATE 2 G/HR: 40 INJECTION, SOLUTION INTRAVENOUS at 03:18

## 2025-06-25 RX ADMIN — Medication 60 MILLI-UNITS/MIN: at 11:45

## 2025-06-25 RX ADMIN — MAGNESIUM SULFATE HEPTAHYDRATE 2 G/HR: 40 INJECTION, SOLUTION INTRAVENOUS at 23:04

## 2025-06-25 RX ADMIN — SODIUM CHLORIDE, SODIUM LACTATE, POTASSIUM CHLORIDE, AND CALCIUM CHLORIDE 500 ML: .6; .31; .03; .02 INJECTION, SOLUTION INTRAVENOUS at 05:36

## 2025-06-25 RX ADMIN — ACETAMINOPHEN 975 MG: 325 TABLET ORAL at 18:27

## 2025-06-25 RX ADMIN — LIDOCAINE HYDROCHLORIDE,EPINEPHRINE BITARTRATE 3 ML: 20; .005 INJECTION, SOLUTION EPIDURAL; INFILTRATION; INTRACAUDAL; PERINEURAL at 06:28

## 2025-06-25 SDOH — HEALTH STABILITY: MENTAL HEALTH: CURRENT SMOKER: 0

## 2025-06-25 ASSESSMENT — PAIN SCALES - GENERAL
PAINLEVEL_OUTOF10: 6
PAINLEVEL_OUTOF10: 1
PAINLEVEL_OUTOF10: 1
PAINLEVEL_OUTOF10: 6
PAINLEVEL_OUTOF10: 1
PAINLEVEL_OUTOF10: 1
PAINLEVEL_OUTOF10: 2
PAINLEVEL_OUTOF10: 3
PAINLEVEL_OUTOF10: 2

## 2025-06-25 ASSESSMENT — PAIN DESCRIPTION - DESCRIPTORS
DESCRIPTORS: HEADACHE
DESCRIPTORS: ACHING
DESCRIPTORS: ACHING
DESCRIPTORS: HEADACHE
DESCRIPTORS: ACHING

## 2025-06-25 ASSESSMENT — PAIN DESCRIPTION - LOCATION: LOCATION: HEAD

## 2025-06-25 ASSESSMENT — PAIN - FUNCTIONAL ASSESSMENT: PAIN_FUNCTIONAL_ASSESSMENT: 0-10

## 2025-06-25 NOTE — SIGNIFICANT EVENT
LABOR PROGRESS NOTE  SUBJECTIVE  Patient uncomfortable with balloon. Okay with SVE now.     OBJECTIVE  Visit Vitals  /71   Pulse 92   Temp 36.4 °C (97.5 °F) (Oral)   Resp 18        FHT: 135/mod/+accel/-decel  Ducor: irregular ctx    A&P    IOL  - CRB out  - Recheck as clinically indicated by maternal or fetal status  - Continue pitocin per protocol, currently at 4  - will AROM when appropriate  - CEFM, Cat I currently    Sierra Trejo MD, PGY-2

## 2025-06-25 NOTE — L&D DELIVERY NOTE
Vaginal Delivery Note    Patient Name: Leighann Lu  : 1990  MRN: [id number]  Age: 35 y.o.    /Para:   Gestational Age: 37w4d    Date of Delivery: 2025    Procedure: Normal Spontaneous Vaginal Delivery    Delivery Provider: Christos Arriaza MD    Resident/Fellow/Other Assistant: Nica Ortega MS-4    Description of Procedure:  Delivery of viable infant under epidural anesthesia. Delayed clamping was performed. The infant was placed skin to skin. Cord gases were not sent.  Cord blood was collected. Placenta delivered intact and fundus was firm    Bilateral periurethral Lacerations identified and were hemostatic - no repair was needed.    Additional Procedures:  None    Findings:   Amniotic fluid Clear, Female infant in Vertex Occiput Anterior presentation, APGARS  ,  .  Birth Weight  . - Viable female infant. See nursery records for weight and APGAR scores    Complications: None    Quantitative Blood Loss:   Delivery QBL: 300 mL (2025 11:11 AM - 2025  2:15 PM)    Blood products:      Uterotonics/Hemostatic Agent: IV Pitocin    Specimen:   Placenta  Delivered: 2025 11:15 AM  Appearance: Intact  Removal: Spontaneous    Disposition: lab  pathology    Sponge/Instrument/Needle Counts: The sponge, lap and needle counts were correct.    Patient Disposition: Patient recovering on labor and delivery in stable condition.    Faye Lu [[id number]]      Labor Events    Sac identifier: Sac 1  Rupture date/time: 2025 0728  Rupture type: Spontaneous  Fluid color: Clear  Labor type: Induced Onset of Labor  Labor allowed to proceed with plans for an attempted vaginal birth?: Yes  Induction: Mariano/EASI, Oxytocin  First cervical ripening date/time: 2025  Induction date/time: 2025  Induction indications: Hypertensive Disorder of Pregnancy  Complications: None       Labor Event Times    Labor onset date/time: 2025  Dilation complete date/time:  2025 1053  Start pushing date/time: 2025 11:08       Placenta    Placenta delivery date/time: 2025 11:15  Placenta removal: Spontaneous  Placenta appearance: Intact  Placenta disposition: lab, pathology       Cord    Vessels: 3 vessels  Complications: None  Delayed cord clamping?: Yes  Cord blood disposition: Discarded  Gases sent?: No  Stem cell collection (by provider): No       Lacerations    Periurethral laceration?: Yes  Periurethral laceration location: bilateral  Periurethral laceration repaired?: No       Anesthesia    Method: Epidural       Operative Delivery    Forceps attempted?: No  Vacuum extractor attempted?: No       Shoulder Dystocia    Shoulder dystocia present?: No       [place] Delivery    Time head delivered: 2025 11:11:15  Birth date/time: 2025 11:11:00  Delivery type: Vaginal, Spontaneous  Complications: None       Resuscitation    Method: None       Apgars    Living status: Living  Apgar Component Scores:  1 min.:  5 min.:  10 min.:  15 min.:  20 min.:    Skin color:         Heart rate:         Reflex irritability:         Muscle tone:         Respiratory effort:         Total:                Delivery Providers    Delivering clinician: Christos Arriaza MD   Provider Role    Gillian Velásquez, RN Delivery Nurse    Autumn Rocha RN Nursery Nurse     Resident

## 2025-06-25 NOTE — PROGRESS NOTES
Intrapartum Progress Note    Assessment/Plan   Leighann Lu is a 35 y.o.  at 37w4d. JONO: 2025, by 7 week US admitted for IOL iso PEC w/SF diagnosed by persistent severe range BP at routine prenatal visit earlier today.     IOL  Complete  - Complete s/p SROM at 0730  - s/p ripening w/ CRB  - Pitocin infusing per protocol, currently at 16  - Epidural infusing     PEC w/ SF  - dx by sustained SRBP in office   - asx currently  - HELLP labs negative x2  - blood pressures normal to mild range  - cont Mg at 2g/hr, no sx of mag toxicity  - no BP med given overall normotensive, will continue to monitor     Anemia  - Hb 9.9 0407 < admission Hb 10.3  - T&C x1    Hypokalemia  - 3.4 < 3.6 on admission  - Replete w Kcl 20mEq, f/u repeat CMP     Fetal Status  - CEFM reassuring, Cat I  - Presentation cephalic based on US  - EFW: 2443 g (12%), AC 16% by US on , extrap to 2693 g  - GBS negative     Postpartum  - Contraception plan: tubal ligation    Patient seen and discussed with Dr. Arsalan Ortega, MS-4      Subjective   Pt resting comfortably in bed. No complaints, feels well. Pt denies headache, vision changes, SOB, CP, RUQ pain, bleeding concerns. Feels increased discomfort with ctx.    Pregnancy notable for:  - 3x term , gHTN in 1st pregnancy   - single umbilical artery  - FGR in 3rd trimester, resolved in subsequent scans  - AMA s/p rr cfDNA  - BMI 42    Objective   Last Vitals:  Temp Pulse Resp BP MAP Pulse Ox   36.6 °C (97.9 °F) 101 16 99/61 76 100 %     Vitals Min/Max Last 24 Hours:  Temp  Min: 36.1 °C (97 °F)  Max: 36.6 °C (97.9 °F)  Pulse  Min: 83  Max: 110  Resp  Min: 16  Max: 18  BP  Min: 90/50  Max: 160/108  MAP (mmHg)  Min: 63  Max: 112    Intake/Output:    Intake/Output Summary (Last 24 hours) at 2025 0730  Last data filed at 2025 0635  Gross per 24 hour   Intake 2891.3 ml   Output 1150 ml   Net 1741.3 ml       Physical Exam  General: NAD, mood  appropriate  Cardiopulmonary: warm and well perfused, breathing comfortably on room air  Abdomen: Gravid, non-tender  Extremities: Symmetric, reflexes intact bilat in UE  Speculum Exam: deferred  Cervix: Deferred     Fetal Monitoring  Baseline: 135 bpm, Variability: moderate,  Accelerations: present and Decelerations: none  Uterine Activity: q2-3min  Interpretation: Category 1    Lab Review:  Labs in chart were reviewed.

## 2025-06-25 NOTE — ANESTHESIA PROCEDURE NOTES
Epidural Block    Patient location during procedure: OB  Reason for block: labor analgesia  Staffing  Performed: resident   Authorized by: Beverly Natarajan MD    Performed by: Beverly Natarajan MD    Preanesthetic Checklist  Completed: patient identified, IV checked, risks and benefits discussed, surgical consent, pre-op evaluation, timeout performed and sterile techniques followed  Block Timeout  RN/Licensed healthcare professional reads aloud to the Anesthesia provider and entire team: Patient identity, procedure with side and site, patient position, and as applicable the availability of implants/special equipment/special requirements.  Patient on coagulant treatment: no  Timeout performed at: 6/25/2025 6:18 AM  Block Placement  Patient position: sitting  Prep: ChloraPrep  Sterility prep: cap, drape, gloves, hand and mask  Sedation level: no sedation  Patient monitoring: blood pressure and continuous pulse oximetry  Approach: midline  Local numbing: lidocaine 1% to skin and subcutaneous tissues  Vertebral space: lumbar  Lumbar location: L3-L4  Epidural  Loss of resistance technique: saline  Guidance: landmark technique        Needle  Needle type: Tuohy   Needle gauge: 17  Needle length: 8.9cm  Needle insertion depth: 7 cm  Catheter size: 20 G  Catheter at skin depth: 12 cm  Catheter securement method: liquid medical adhesive    Test dose: lidocaine 1.5% with epinephrine 1-to-200,000  Test dose: lidocaine 1.5% with epinephrine 1-to-200,000    PCEA  Medication concentration used: 0.2% Ropivacaine with 2 mcg/mL Fentanyl  Dose (mL): 3  Lockout (minutes): 30  1-Hour Limit (boluses/hr): 2  Basal Rate: 10        Assessment  Sensory level: T10  Block outcome: pain improved  Number of attempts: 1  Procedure assessment: patient tolerated procedure well with no immediate complications

## 2025-06-25 NOTE — H&P
OB Admission H&P    Assessment/Plan    Leighann Lu is a 35 y.o.  at 37w3d by 7 week US admitted for IOL iso PEC w/SF diagnosed by persistent severe range BP at routine prenatal visit earlier today.    IOL  - Admit to L&D, consented  - T&S, CMP, CBC on admission, Hgb 10.3  - Cervix unfavorable, CRB placed, pit started  - Patient desires vaginal delivery  - Epidural at patient request    PEC w/ SF  - dx by sustained SRBP in office today  - asx currently  - HELLP labs negative x1, 2nd set pending  - blood pressures normal to mild range  - cont Mg at 2g/hr  - no BP med given overall normotensive, will continue to monitor    Anemia  - admission Hb 10.3    Fetal Status  - CEFM reassuring, Cat I  - Presentation cephalic based on US  - EFW: 2443 g (12%), AC 16% by US on , extrap to 2693 g  - GBS negative    Postpartum  - Contraception plan: tubal ligation    Demetri Hogan, MS3    Patient seen and evaluated with medical student. Agree with assessment above, edits made within text.    Discussed with Dr. Woo Trejo MD, PGY-2      Pregnancy Problems (from 24 to present)       Problem Noted Diagnosed Resolved    Encounter for induction of labor 2025 by Sierra Trejo MD  No    Elevated blood pressure affecting pregnancy, antepartum 2025 by SANDRA Leach  No    Overview Addendum 2025  6:48 PM by SANDRA Leach   Provided instructions for checking at home twice daily, log sheet given, warning signs reviewed. Baseline HELLP labs ordered            Fetal growth restriction antepartum ([facility]) 2025 by SANDRA Leach  No    Overview Addendum 2025  9:18 AM by Rut Benoit RN    - 30w2d - Decreased interval fetal growth. The Ac is at the 9%, and the EFW is at the 7% percentile. repeat doppler, BPP and AFV evaluation scheduled weekly.   Added to case review for may     2025-BP score 8/8          Pruritus of pregnancy,  antepartum ([facility]) 3/12/2025 by SANDRA Leach  No    Overview Addendum 2025 11:34 PM by SANDRA Leach   Patient reports itching on hands and feet that has been getting worse and areas of itching on her body. Labs ordered 3/11 - neg          37 weeks gestation of pregnancy ([facility]) 2024 by SANDRA Leach  No    Overview Addendum 2025 12:14 PM by SANDRA Leach   Desired provider in labor: [x] MANUELAM  [] Physician  [x] Blood Products: [x] Yes, accepts [] No, needs counseling  [x] Initial BMI: 35.34   [x] Prenatal Labs:   [x] Cervical Cancer Screening up to date  -  result pending   [x] Rh status: pos  [x] Genetic Screening:  ccfDNA  neg - its a girl!  [x] NT US: (11-13 wks)  [x] Baby ASA (if indicated): pt is taking   [x] Pregnancy dated by: 7.4 week US     [x] Anatomy US: (19-20 wks)  [] Federal Sterilization consent signed (if indicated):  [x] 1hr GCT at 24-28wks:  [] Rhogam (if indicated):   [] Fetal Surveillance (if indicated):  [x] Tdap (27-32 wks, may be given up to 36 wks if initial window missed):   [] RSV (32-36 wks) (Sept. to end of ):   [] Flu Vaccine:    [x] Breastfeeding: pumpimg, and bottle feeding; pump on hand already  [x] Postpartum Birth control method: does not want more kids - considering tubal   [x] GBS at 36 - 37 wks: talked about completing at next visit  [] 39 weeks discussion of IOL vs. Expectant management:  [] Mode of delivery ( anticipated ):          Supervision of other normal pregnancy, antepartum ([facility]) 2024 by SANDRA Leach  No    Obesity in pregnancy ([facility]) 2024 by SANDRA Leach  No    Overview Signed 2024  9:48 PM by Dorothy Edouard, SANDRA   Growth scan 30 and 36 weeks, BPP or NST weekly 37 weeks to delivery, and recommended delivery 39 0/7 - 39 6/7   Starting BMI 35                 Subjective   Leighann HAN Aly is a 35 year old  at 37.3 WGA by 7 week US presenting following 2x  severe range BPs of 160/100 and 160/108 on recheck earlier today at a routine prenatal visit. She has had 1/10 headaches intermittently throughout her pregnancy. She denies visual changes, CP, SOB, or RUQ pain. She had gHTN during her first pregnancy in  and has no history of PEC. She denies h/o asthma or seizure disorder.    OBHx: 3x term , gHTN in 1st pregnancy  Gyn Hx: no prior STIs, no UTIs this pregnancy  PMH: none  PSH: none  Med: PNV, baby ASA, allegra  Allegies: none   Social: denies a/t/d    Pregnancy history notable for:  - mild anemia, admission Hgb 10.3  - single umbilical artery  - FGR in 3rd trimester, resolved in subsequent scans  - AMA s/p rr cfDNA  - BMI 42    Prenatal Provider Dorothy Edouard, APRN-CNM    OB History    Para Term  AB Living   4 3 3 0 0 3   SAB IAB Ectopic Multiple Live Births   0 0 0 0 3      # Outcome Date GA Lbr Kolby/2nd Weight Sex Type Anes PTL Lv   4 Current            3 Term 12/20/15 39w0d   F Vag-Spont   RAVEN      Name: Chrissie   2 Term 09 40w0d   M Vag-Spont   RAVEN      Name: Renaldo   1 Term 07 40w0d   M Vag-Spont EPI  RAVEN      Complications: Hypertension      Name: Berto       Surgical History[1]    Social History     Tobacco Use    Smoking status: Never    Smokeless tobacco: Never   Substance Use Topics    Alcohol use: Never       Allergies[2]    Prescriptions Prior to Admission[3]    Objective     Last Vitals  Temp Pulse Resp BP MAP O2 Sat   36.1 °C (97 °F) 109 18 (!) 140/94 112 96 %     Blood Pressures         2025          BP: 112/73 140/94               Physical Exam  General: NAD, mood appropriate  Cardiopulmonary: warm and well perfused, breathing comfortably on room air  Abdomen: Gravid, non-tender  Extremities: Symmetric  Speculum Exam: deferred  Cervix: 50 /-3     Chaperone Present: Yes.  Chaperone Name/Title: Hui Fox RN  Examination Chaperoned: Entire Physical Exam     Fetal Monitoring  Baseline: 135  bpm, Variability: moderate,  Accelerations: present and Decelerations: none  Uterine Activity: No contractions seen on toco  Interpretation: Category 1    Bedside ultrasound: Yes, cephalic     Labs in chart were reviewed.  0   Lab Value Date/Time    GRPBSTREP SEE NOTE 06/17/2025 1220     CBC   Recent Labs     06/24/25 2025   WBC 9.3   HGB 10.3*   HCT 32.0*        CMP   Recent Labs     06/24/25 2025   *   K 3.6      CO2 21   ANIONGAP 15   BUN 6   CREATININE 0.51   EGFR >90   CALCIUM 8.4*   ALBUMIN 3.3*   PROT 6.2*   ALKPHOS 100   ALT 9   AST 17   BILITOT 0.3             Prenatal labs reviewed, remarkable for Hgb 11.8 4/14, otherwise unremarkable.             [1] History reviewed. No pertinent surgical history.  [2] No Known Allergies  [3]   Medications Prior to Admission   Medication Sig Dispense Refill Last Dose/Taking    aspirin 81 mg EC tablet Take 1 tablet (81 mg) by mouth once daily.       fexofenadine HCl (ALLEGRA ORAL) Take by mouth.       prenatal no.144-folic acid 400 mcg tablet,chewable Chew.

## 2025-06-25 NOTE — PROGRESS NOTES
Postpartum Progress Note    Assessment/Plan   Leighann Lu is a 35 y.o., , who delivered at 37w4d gestation and is now postpartum day 0 from IOL in s/o sPEC.    Pre-Eclampsia w/ SF  Diagnosed by sustained severe range Bps in office    Currently on no meds  HELLP labs negative x2, P:C 0.123  Asymptomatic, normotensive  Continue magnesium 2g/hr for seizure prophylaxis, no signs/symptoms of mag toxicity until     Postpartum care  Continue routine postpartum care  Bottle feeding  Contraception plan: BTL          Subjective   Pt is currently resting comfortably in bed. Pain is well-tolerated. Denies HA, RUQ, or vision changes. No concerns regarding mood.   No concerns at this time.    Pregnancy notables:  sPEC, diagnosed by 2 SRBPs in office , HELLP neg x2  resolved FGR, most recent EFW 12%  single umbilical artery  Class II obesity  Pruritus of hands and feet, bile acids wnl    Crystal Agee,    D/w the medical student and agree with the above,    D/w Dr. Vincent,  Millie Lambert MD, PGY4    Objective     Last Vitals:  Temp Pulse Resp BP MAP Pulse Ox   36.4 °C (97.5 °F) 84 18 130/83 (back to bed after up to bedside commode) 99 98 %     Vitals Min/Max Last 24 Hours:  Temp  Min: 35.9 °C (96.6 °F)  Max: 36.6 °C (97.9 °F)  Pulse  Min: 72  Max: 110  Resp  Min: 16  Max: 20  BP  Min: 90/50  Max: 140/94  MAP (mmHg)  Min: 63  Max: 112    Intake/Output:     Intake/Output Summary (Last 24 hours) at 20255  Last data filed at 2025 1730  Gross per 24 hour   Intake 4727.88 ml   Output 3911 ml   Net 816.88 ml       Physical Exam:  General: Examination reveals a well developed, well nourished, female, in no acute distress. She is alert and cooperative.  Lungs: clear to auscultation bilaterally and no crackles.  Cardiac: regular rate and rhythm, S1, S2 normal, no murmur, click, rub or gallop.  Abdomen: soft, non-tender.  Neurological: alert, oriented, normal speech, no focal findings or movement  disorder noted, DTRs normal and symmetrical.      Lab Data:  Labs in chart were reviewed.  Lab Results   Component Value Date    WBC 9.7 06/25/2025    HGB 9.9 (L) 06/25/2025    HCT 31.6 (L) 06/25/2025     06/25/2025     Crystal Agee, M4

## 2025-06-25 NOTE — ANESTHESIA PREPROCEDURE EVALUATION
"Patient: Leighann Lu \"Ruth\"    Evaluation Method: In-person visit    Procedure Information    Date: 06/25/25  Procedure: Labor Analgesia         Relevant Problems   Cardiac   (+) Elevated blood pressure affecting pregnancy, antepartum      Endocrine   (+) Obesity in pregnancy ([facility])      GYN   (+) 37 weeks gestation of pregnancy ([facility])   (+) Supervision of other normal pregnancy, antepartum ([facility])       Clinical information reviewed:   Tobacco  Allergies  Meds   Med Hx  Surg Hx   Fam Hx  Soc Hx        NPO Detail:  No data recorded     OB/Gyn Evaluation    Present Pregnancy    Patient is pregnant now.   Obstetric History            Physical Exam    Airway  Mallampati: II  TM distance: >3 FB  Neck ROM: full  Mouth opening: 3 or more finger widths     Cardiovascular - normal exam   Dental - normal exam     Pulmonary - normal exam   Abdominal        Visit Vitals  /70   Pulse 93   Temp 36.6 °C (97.9 °F) (Temporal)   Resp 18      Anesthesia Plan    History of general anesthesia?: yes  History of complications of general anesthesia?: unknown/emergency    ASA 3     epidural     The patient is not a current smoker.    Anesthetic plan and risks discussed with patient.  Use of blood products discussed with patient who consented to blood products.    Plan discussed with resident.      "

## 2025-06-26 LAB
ALBUMIN SERPL BCP-MCNC: 2.9 G/DL (ref 3.4–5)
ALP SERPL-CCNC: 83 U/L (ref 33–110)
ALT SERPL W P-5'-P-CCNC: 9 U/L (ref 7–45)
ANION GAP SERPL CALC-SCNC: 10 MMOL/L (ref 10–20)
AST SERPL W P-5'-P-CCNC: 17 U/L (ref 9–39)
BILIRUB SERPL-MCNC: 0.2 MG/DL (ref 0–1.2)
BUN SERPL-MCNC: 5 MG/DL (ref 6–23)
CALCIUM SERPL-MCNC: 6.5 MG/DL (ref 8.6–10.6)
CHLORIDE SERPL-SCNC: 105 MMOL/L (ref 98–107)
CO2 SERPL-SCNC: 25 MMOL/L (ref 21–32)
CREAT SERPL-MCNC: 0.55 MG/DL (ref 0.5–1.05)
EGFRCR SERPLBLD CKD-EPI 2021: >90 ML/MIN/1.73M*2
GLUCOSE SERPL-MCNC: 89 MG/DL (ref 74–99)
POTASSIUM SERPL-SCNC: 4.1 MMOL/L (ref 3.5–5.3)
PROT SERPL-MCNC: 5.7 G/DL (ref 6.4–8.2)
SODIUM SERPL-SCNC: 136 MMOL/L (ref 136–145)

## 2025-06-26 PROCEDURE — 84075 ASSAY ALKALINE PHOSPHATASE: CPT | Performed by: OBSTETRICS & GYNECOLOGY

## 2025-06-26 PROCEDURE — 99199 UNLISTED SPECIAL SVC PX/RPRT: CPT

## 2025-06-26 PROCEDURE — 2500000004 HC RX 250 GENERAL PHARMACY W/ HCPCS (ALT 636 FOR OP/ED)

## 2025-06-26 PROCEDURE — 2500000001 HC RX 250 WO HCPCS SELF ADMINISTERED DRUGS (ALT 637 FOR MEDICARE OP)

## 2025-06-26 PROCEDURE — 1210000001 HC SEMI-PRIVATE ROOM DAILY

## 2025-06-26 RX ADMIN — SODIUM CHLORIDE, POTASSIUM CHLORIDE, SODIUM LACTATE AND CALCIUM CHLORIDE 75 ML/HR: 600; 310; 30; 20 INJECTION, SOLUTION INTRAVENOUS at 01:57

## 2025-06-26 RX ADMIN — IBUPROFEN 600 MG: 600 TABLET ORAL at 00:51

## 2025-06-26 RX ADMIN — ACETAMINOPHEN 975 MG: 325 TABLET ORAL at 18:03

## 2025-06-26 RX ADMIN — IBUPROFEN 600 MG: 600 TABLET ORAL at 13:13

## 2025-06-26 RX ADMIN — ACETAMINOPHEN 975 MG: 325 TABLET ORAL at 00:51

## 2025-06-26 RX ADMIN — ACETAMINOPHEN 975 MG: 325 TABLET ORAL at 06:57

## 2025-06-26 RX ADMIN — IBUPROFEN 600 MG: 600 TABLET ORAL at 06:57

## 2025-06-26 RX ADMIN — MAGNESIUM SULFATE HEPTAHYDRATE 2 G/HR: 40 INJECTION, SOLUTION INTRAVENOUS at 08:45

## 2025-06-26 RX ADMIN — IBUPROFEN 600 MG: 600 TABLET ORAL at 18:03

## 2025-06-26 RX ADMIN — ACETAMINOPHEN 975 MG: 325 TABLET ORAL at 13:13

## 2025-06-26 ASSESSMENT — PAIN SCALES - GENERAL
PAINLEVEL_OUTOF10: 0 - NO PAIN
PAINLEVEL_OUTOF10: 2
PAINLEVEL_OUTOF10: 1
PAINLEVEL_OUTOF10: 0 - NO PAIN
PAIN_LEVEL: 2
PAINLEVEL_OUTOF10: 0 - NO PAIN

## 2025-06-26 ASSESSMENT — PAIN DESCRIPTION - DESCRIPTORS
DESCRIPTORS: HEADACHE
DESCRIPTORS: CRAMPING

## 2025-06-26 ASSESSMENT — PAIN - FUNCTIONAL ASSESSMENT: PAIN_FUNCTIONAL_ASSESSMENT: 0-10

## 2025-06-26 ASSESSMENT — PAIN DESCRIPTION - LOCATION: LOCATION: ABDOMEN

## 2025-06-26 NOTE — PROGRESS NOTES
06/26/25 1049   Discharge Planning   Home or Post Acute Services   (blood pressure monitor)     Patient meets criteria for home monitoring of blood pressure post discharge.  Reason: current preeclampsia with severe features. Met with patient to assess for availability of home BP monitor.  Patient stated she owns home BP monitor.  Patient educated on importance of continuing to monitor BP at home, recording BP on home monitoring log and s/sx of when to call her provider.  Pt verbalized understanding the above information.

## 2025-06-26 NOTE — ANESTHESIA POSTPROCEDURE EVALUATION
"Patient: Leighann Lu \"Ruth\"    Procedure Summary       Date: 25 Room / Location:     Anesthesia Start:  Anesthesia Stop:     Procedure: Labor Analgesia Diagnosis:     Scheduled Providers:  Responsible Provider: Angeli Amador MD    Anesthesia Type: epidural ASA Status: 3            Anesthesia Type: epidural  Leighann Lu is a 35 y.o., , who had a Vaginal, Spontaneous delivery on 2025 at 37w4d and is now POD1.    She had Neuraxial Anesthesia without immediate complications noted.       Pain well controlled    Vitals:    25 0656   BP: 130/71   Pulse: 86   Resp: 18   Temp:    SpO2: 98%       Neuraxial site assessed however epidural is still in as patient is desiring a tubal today. No visible redness or swelling or drainage. Patient able to ambulate and move all extremities without difficulty. Able to void. No complaints of nausea/vomiting. Tolerating PO intake well. No s/sx of PDPH.     Anesthesia will sign off     Carlee Garay MD        Anesthesia Post Evaluation    Patient location during evaluation: bedside  Patient participation: complete - patient participated  Level of consciousness: awake and alert  Pain score: 2  Pain management: adequate  Airway patency: patent  Cardiovascular status: acceptable and hemodynamically stable  Respiratory status: acceptable  Hydration status: acceptable  Postoperative Nausea and Vomiting: none        No notable events documented.    "

## 2025-06-26 NOTE — CARE PLAN
The patient's goals for the shift include rest    The clinical goals for the shift include VS wnl    Over the shift, the patient made progress toward the following goals. Assessment and VS wnl this shift.

## 2025-06-26 NOTE — LACTATION NOTE
This note was copied from a baby's chart.  Lactation Consultant Note  Lactation Consultation  Reason for Consult: Initial assessment, Other (Comment) (mother expressed interest per RN in exclusively pumping)  Consultant Name: Nataly Baker RN IBCLC    Maternal Information  Exclusive Pump and Bottle Feed: Yes    Maternal Assessment  Breast Assessment: Other (Comment) (deferred this visit)  Nipple Assessment: Other (Comment) (deferred this visit)  Areola Assessment: Other (Comment) (deferred this visit)    Infant Assessment       Feeding Assessment       LATCH TOOL       Breast Pump       Other OB Lactation Tools       Patient Follow-up  Inpatient Lactation Follow-up Needed : Yes    Other OB Lactation Documentation       Recommendations/Summary  Attempted a lactation consult with mother. Family and visitors at bedside. Consult deferred at this time. Instructed mother to call when she is ready.

## 2025-06-26 NOTE — PROGRESS NOTES
Postpartum Progress Note    Assessment/Plan   Leighann Lu is a 35 y.o.  now PPD1 s/p Vaginal, Spontaneous on 2025 admitted for postpartum magnesium in the setting of severe pre-eclampsia.     Severe pre-eclampsia  Diagnosed by sustained severe range Bps in office    Currently on no meds  HELLP labs negative x2, P:C 0.123  Normotensive  Endorses mild HA since Tuesday (prior to delivery/Mg)  Continue magnesium 2g/hr for seizure prophylaxis, no signs/symptoms of mag toxicity, until 1100    Postpartum care  Continue routine postpartum care  Bottle feeding  Contraception plan: depo to BTL    Patient discussed with Dr. Fausto Ortega MS4    PGY-3 Addendum: I saw and evaluated the patient with the medical student. I have made any necessary modifications within the text.  Imani Lambert MD  PGY3, Obstetrics and Gynecology     Subjective   Pt resting comfortably in bed. No concerns at this time, feels well. Pt denies vision changes, SOB, CP, RUQ pain, bleeding concerns. Endorses mild HA since Tuesday (prior to delivery/Mg).      Objective   Last Vitals:  Temp Pulse Resp BP MAP Pulse Ox   36.9 °C (98.4 °F) 86 18 130/71   98 %     Vitals Min/Max Last 24 Hours:  Temp  Min: 35.9 °C (96.6 °F)  Max: 36.9 °C (98.4 °F)  Pulse  Min: 72  Max: 101  Resp  Min: 16  Max: 20  BP  Min: 100/58  Max: 130/71    Intake/Output:    Intake/Output Summary (Last 24 hours) at 2025 0811  Last data filed at 2025 0656  Gross per 24 hour   Intake 5072.38 ml   Output 6101 ml   Net -1028.62 ml       Physical Examination:  General: no acute distress  HEENT: normocephalic, atraumatic  Heart: normal rate, regular rhythm  Lungs: CTAB  Abdomen: soft, nondistended  Extremities: moving all extremities  Neuro: awake and conversant, reflexes intact in bilateral UE  Psych: appropriate mood and affect    Lab Review:  Results from last 7 days   Lab Units 25  0659 25  0407 25   WBC AUTO x10*3/uL  --  9.7 9.3    HEMOGLOBIN g/dL  --  9.9* 10.3*   HEMATOCRIT %  --  31.6* 32.0*   PLATELETS AUTO x10*3/uL  --  243 292   AST U/L 17 15 17   ALT U/L 9 8 9   CREATININE mg/dL 0.55 0.53 0.51

## 2025-06-27 ENCOUNTER — PHARMACY VISIT (OUTPATIENT)
Dept: PHARMACY | Facility: CLINIC | Age: 35
End: 2025-06-27
Payer: COMMERCIAL

## 2025-06-27 VITALS
WEIGHT: 247.36 LBS | BODY MASS INDEX: 42.23 KG/M2 | OXYGEN SATURATION: 96 % | HEIGHT: 64 IN | DIASTOLIC BLOOD PRESSURE: 77 MMHG | HEART RATE: 80 BPM | TEMPERATURE: 98.1 F | SYSTOLIC BLOOD PRESSURE: 120 MMHG | RESPIRATION RATE: 18 BRPM

## 2025-06-27 PROCEDURE — 2500000001 HC RX 250 WO HCPCS SELF ADMINISTERED DRUGS (ALT 637 FOR MEDICARE OP)

## 2025-06-27 PROCEDURE — RXMED WILLOW AMBULATORY MEDICATION CHARGE

## 2025-06-27 RX ORDER — POLYETHYLENE GLYCOL 3350 17 G/17G
17 POWDER, FOR SOLUTION ORAL 2 TIMES DAILY PRN
Qty: 238 G | Refills: 0 | Status: SHIPPED | OUTPATIENT
Start: 2025-06-27

## 2025-06-27 RX ORDER — ACETAMINOPHEN 325 MG/1
975 TABLET ORAL EVERY 6 HOURS PRN
Qty: 120 TABLET | Refills: 0 | Status: SHIPPED | OUTPATIENT
Start: 2025-06-27

## 2025-06-27 RX ORDER — IBUPROFEN 600 MG/1
600 TABLET, FILM COATED ORAL EVERY 6 HOURS PRN
Qty: 60 TABLET | Refills: 0 | Status: SHIPPED | OUTPATIENT
Start: 2025-06-27

## 2025-06-27 RX ADMIN — ACETAMINOPHEN 975 MG: 325 TABLET ORAL at 00:52

## 2025-06-27 RX ADMIN — IBUPROFEN 600 MG: 600 TABLET ORAL at 00:52

## 2025-06-27 RX ADMIN — IBUPROFEN 600 MG: 600 TABLET ORAL at 06:49

## 2025-06-27 RX ADMIN — ACETAMINOPHEN 975 MG: 325 TABLET ORAL at 06:48

## 2025-06-27 ASSESSMENT — PAIN SCALES - GENERAL
PAINLEVEL_OUTOF10: 0 - NO PAIN
PAINLEVEL_OUTOF10: 2

## 2025-06-27 ASSESSMENT — PAIN DESCRIPTION - DESCRIPTORS: DESCRIPTORS: SORE

## 2025-06-27 NOTE — LACTATION NOTE
Lactation Consultant Note  Lactation Consultation  Reason for Consult: Initial assessment  Consultant Name: Margie Mckoy RN IBCLC    Maternal Information  Has mother  before?: Yes  How long did the mother previously breastfeed?: 1 year with her last child (now 9 y.o)  Previous Maternal Breastfeeding Challenges: None  Infant to breast within first 2 hours of birth?: No  Breastfeeding Delayed Due to:  (mother plans to exclusively pump)  Exclusive Pump and Bottle Feed: Yes    Maternal Assessment  Breast Assessment: Large, Symmetrical  Nipple Assessment: Intact, Erect  Areola Assessment: Normal    Infant Assessment  Infant Behavior: Deep sleep  Infant Assessment:  (deferred)    Feeding Assessment  Nutrition Source: Formula (per mother’s request), Breastmilk  Feeding Method: Feeding expressed breastmilk, Paced bottle  Unable to assess infant feeding at this time:  (exclusively pumping)    LATCH TOOL       Breast Pump  Pump: Hospital grade electric pump, Double breast pumping  Frequency: Unable to recall  Duration: Initiate phase  Breast Shield Size and Type: 21 mm (measured mother's R nipple at 19mm and L at 18mm)  Volume of Milk Production: 1.6  Units of Volume: mL per session    Other OB Lactation Tools  Lactation Tools: Flanges    Patient Follow-up  Outpatient Lactation Follow-up: Recommended    Other OB Lactation Documentation  Maternal Risk Factors: Age over 30, primiparity, Preeclampsia  Infant Risk Factors: Early term birth 37-39 weeks    Recommendations/Summary  Mother had expressed an interest in exclusively pumping to provide expressed breast milk to infant. Although mother exclusively breast fed her last child without issues, she is not interested in latching infant at this time. Mother has primarily been feeding infant formula but has been setup using the symphony pump at bedside and has pumped a few times. She stated she produced about 1.5ml with one pumping session which she syringe fed to infant.      I reviewed the use of symphony pump initiate program, use of heat and massage prior to pumping and milk storage guidelines. Gave mother PI sheet 123 for her reference. Mother has been using the size 21mm flanges and stated they have been comfortable. I encouraged mother to continue using this size based on her nipple measurement. We discussed the difference in pumping and latching, typical milk production patterns, and colostrum volume expectations at this time. I encouraged mother to pump every 3 hours to promote the production of a full milk supply, and to provide infant with her expressed breast milk first, followed by formula supplementation as indicated until her milk volume increases.     Mother has a wearable breast pump for home use which she purchased. I explained that a plug in electric pump would be recommended, especially if exclusively pumping and offered to order one for her through insurance. Mother receptive, I will place an order for a Medela breast pump via [employer]. I reviewed outpatient lactation resources available to her. Encouraged mother to call out for any questions, concerns and assistance as needed.     1400: Trishar to be Dc'ed home today, her pump has not yet been delivered. I provided her with a manual pump and instructed her on its use. I also provided her with [employer] contact information.

## 2025-06-27 NOTE — DISCHARGE SUMMARY
Discharge Summary    Leighann Lu is a 35 y.o. year old female , who delivered at 37w4d gestation via Vaginal, Spontaneous after IOL in s/o PEC w/ SF, now PPD#2.    Admission Date: 2025  Discharge Date: 25       Discharge Diagnosis  Vaginal, Spontaneous    Hospital Course  Delivery Date: 2025 11:11 AM  Delivery type: Vaginal, Spontaneous   GA at delivery: 37w4d   Outcome: Living  Intrapartum complications: None  Feeding method: Breastfeeding Status: No     Procedures: none  Contraception at discharge: Interval tubal sterilization, declines bridge method at this time. We discussed pregnancy spacing of at least one year, abstaining from intercourse for 6wks, and the ability to become pregnant in the absence of regular menses. Pt verbalized understanding.    PEC w/ SF  - Dx by sustained severe range BPs in office   - BP WNL on no meds postpartum  - Asymptomatic  - HELLP labs negative x2, P:D 0.123  - The signs and symptoms of PEC were reviewed with the patient, including unrelenting headache, vision changes/blurred vision, and pain underneath the right breast.   - BP cuff for home for checking BP BID. Pt instructed to call primary OB if SBP > 160 or DBP > 110.   - I have reviewed with the patient the standard 3 day stay for hypertensive disorders and the risks/benefits of early discharge, including death, stroke, seizure, and readmission. I strongly recommended that the patient stay for the recommended 3 days. She verbalizes understanding of risks. She verbalizes understanding of symptoms and BPs that warrant immediate medical attention, and desires discharge home today.      Meeting all postpartum milestones. OK for DC today and follow up as below.   - Follow-up in 2-5 days for BP check  - Follow-up in 4-6wks with primary OGYN    Pertinent Physical Exam At Time of Discharge    General: Examination reveals a well developed, well nourished, female, in no acute distress. She is alert and  "cooperative.  Lungs: symmetrical, non-labored breathing.  Cardiac: warm, well-perfused.  Abdomen: soft, non-tender.  Fundus: firm, below umbilicus, and nontender.  Extremities: no redness or tenderness in the calves or thighs.  Neurological: alert, oriented, normal speech, no focal findings or movement disorder noted.     Vitals  /77   Pulse 80   Temp 36.7 °C (98.1 °F)   Resp 18   Ht 1.626 m (5' 4\")   Wt 112 kg (247 lb 5.7 oz)   LMP 10/12/2024   SpO2 96%   Breastfeeding No   BMI 42.46 kg/m²      Discharge Meds     Your medication list        START taking these medications        Instructions Last Dose Given Next Dose Due   acetaminophen 325 mg tablet  Commonly known as: Tylenol      Take 3 tablets (975 mg) by mouth every 6 hours if needed for mild pain (1 - 3) or moderate pain (4 - 6).       ibuprofen 600 mg tablet      Take 1 tablet (600 mg) by mouth every 6 hours if needed for mild pain (1 - 3) or moderate pain (4 - 6).       polyethylene glycol 17 gram/dose powder  Commonly known as: Glycolax, Miralax      Mix 17 g of powder and drink 2 times a day as needed for constipation.              CONTINUE taking these medications        Instructions Last Dose Given Next Dose Due   ALLEGRA ORAL                  STOP taking these medications      aspirin 81 mg EC tablet        prenatal no.144-folic acid 400 mcg tablet,chewable                  Where to Get Your Medications        These medications were sent to [facility]  [address] [place] [address]      Hours: 8:30 AM to 5 PM Mon-Fri Phone: [phone]   acetaminophen 325 mg tablet  ibuprofen 600 mg tablet  polyethylene glycol 17 gram/dose powder          Complications Requiring Follow-Up  PEC w/ SF    Test Results Pending At Discharge  Pending Labs       Order Current Status    Surgical Pathology Exam - PLACENTA In process            Outpatient Follow-Up    I spent 20 minutes in the professional and overall care of this " patient.      Gillian Hawley, APRN-CNP

## 2025-06-27 NOTE — CARE PLAN
Problem: Postpartum  Goal: Experiences normal postpartum course  Outcome: Met  Goal: Appropriate maternal -  bonding  Outcome: Met  Goal: Establish and maintain infant feeding pattern for adequate nutrition  Outcome: Met  Goal: Incisions, wounds, or drain sites healing without S/S of infection  Outcome: Met  Goal: No s/sx infection  Outcome: Met  Goal: No s/sx of hemorrhage  Outcome: Met  Goal: Minimal s/sx of HDP and BP<160/110  Outcome: Met   The patient's goals for the shift include Go home    The clinical goals for the shift include Meet postpartum milestones    Over the shift, the patient did make progress toward the following goals. Discharge education reviewed with patient and patient ready for discharge with  and

## 2025-06-28 LAB
BLOOD EXPIRATION DATE: NORMAL
DISPENSE STATUS: NORMAL
PRODUCT BLOOD TYPE: 6200
PRODUCT CODE: NORMAL
UNIT ABO: NORMAL
UNIT NUMBER: NORMAL
UNIT RH: NORMAL
UNIT VOLUME: 350
XM INTEP: NORMAL

## 2025-07-01 ENCOUNTER — POSTPARTUM VISIT (OUTPATIENT)
Dept: OBSTETRICS AND GYNECOLOGY | Facility: CLINIC | Age: 35
End: 2025-07-01
Payer: COMMERCIAL

## 2025-07-01 ENCOUNTER — APPOINTMENT (OUTPATIENT)
Dept: RADIOLOGY | Facility: CLINIC | Age: 35
End: 2025-07-01
Payer: COMMERCIAL

## 2025-07-01 ENCOUNTER — APPOINTMENT (OUTPATIENT)
Dept: OBSTETRICS AND GYNECOLOGY | Facility: CLINIC | Age: 35
End: 2025-07-01
Payer: COMMERCIAL

## 2025-07-01 VITALS — SYSTOLIC BLOOD PRESSURE: 150 MMHG | BODY MASS INDEX: 39.82 KG/M2 | WEIGHT: 232 LBS | DIASTOLIC BLOOD PRESSURE: 90 MMHG

## 2025-07-01 DIAGNOSIS — Z30.09 CONTRACEPTIVE EDUCATION: ICD-10-CM

## 2025-07-01 DIAGNOSIS — Z87.59 HISTORY OF PRE-ECLAMPSIA: ICD-10-CM

## 2025-07-01 PROBLEM — O99.719: Status: RESOLVED | Noted: 2025-03-12 | Resolved: 2025-07-01

## 2025-07-01 PROBLEM — Z34.80 SUPERVISION OF OTHER NORMAL PREGNANCY, ANTEPARTUM (HHS-HCC): Status: RESOLVED | Noted: 2024-11-23 | Resolved: 2025-07-01

## 2025-07-01 PROBLEM — O36.5990 FETAL GROWTH RESTRICTION ANTEPARTUM (HHS-HCC): Status: RESOLVED | Noted: 2025-05-06 | Resolved: 2025-07-01

## 2025-07-01 PROBLEM — Z34.90 ENCOUNTER FOR INDUCTION OF LABOR: Status: RESOLVED | Noted: 2025-06-24 | Resolved: 2025-07-01

## 2025-07-01 PROBLEM — O99.210 OBESITY IN PREGNANCY (HHS-HCC): Status: RESOLVED | Noted: 2024-11-23 | Resolved: 2025-07-01

## 2025-07-01 PROBLEM — O16.9 ELEVATED BLOOD PRESSURE AFFECTING PREGNANCY, ANTEPARTUM: Status: RESOLVED | Noted: 2025-06-04 | Resolved: 2025-07-01

## 2025-07-01 PROBLEM — Z3A.37 37 WEEKS GESTATION OF PREGNANCY (HHS-HCC): Status: RESOLVED | Noted: 2024-11-23 | Resolved: 2025-07-01

## 2025-07-01 PROBLEM — L29.9: Status: RESOLVED | Noted: 2025-03-12 | Resolved: 2025-07-01

## 2025-07-01 PROCEDURE — 0503F POSTPARTUM CARE VISIT: CPT

## 2025-07-01 ASSESSMENT — EDINBURGH POSTNATAL DEPRESSION SCALE (EPDS)
I HAVE BEEN SO UNHAPPY THAT I HAVE BEEN CRYING: NO, NEVER
I HAVE LOOKED FORWARD WITH ENJOYMENT TO THINGS: AS MUCH AS I EVER DID
I HAVE BEEN ABLE TO LAUGH AND SEE THE FUNNY SIDE OF THINGS: AS MUCH AS I ALWAYS COULD
I HAVE BEEN ANXIOUS OR WORRIED FOR NO GOOD REASON: NO, NOT AT ALL
THE THOUGHT OF HARMING MYSELF HAS OCCURRED TO ME: NEVER
I HAVE FELT SCARED OR PANICKY FOR NO GOOD REASON: NO, NOT AT ALL
I HAVE BLAMED MYSELF UNNECESSARILY WHEN THINGS WENT WRONG: NO, NEVER
TOTAL SCORE: 0
I HAVE FELT SAD OR MISERABLE: NO, NOT AT ALL
I HAVE BEEN SO UNHAPPY THAT I HAVE HAD DIFFICULTY SLEEPING: NOT AT ALL
THINGS HAVE BEEN GETTING ON TOP OF ME: NO, I HAVE BEEN COPING AS WELL AS EVER

## 2025-07-01 NOTE — PROGRESS NOTES
Subjective   35 y.o.  presenting for postpartum follow-up     Delivery Date: 2025  Gestational Age: 37w4d   Type of Delivery: Vaginal, Spontaneous     Labor course complicated by sPEC. Not on antihypertensive     Pt has been checking blood pressures at home. Bps have been lower than 130/89. Denies HA, visual changes, epigastric pain.     Pregnancy Problems (from 24 to present)       Problem Noted Diagnosed Resolved    Encounter for induction of labor 2025 by Sierra Trejo MD  No    Priority:  Medium       Elevated blood pressure affecting pregnancy, antepartum 2025 by SANDRA Leach  No    Priority:  Medium       Overview Addendum 2025  6:48 PM by SANDRA Leach   Provided instructions for checking at home twice daily, log sheet given, warning signs reviewed. Baseline HELLP labs ordered            Fetal growth restriction antepartum ([facility]) 2025 by SANDRA Leach  No    Priority:  Medium       Overview Addendum 2025  9:18 AM by Rut Benoit RN    - 30w2d - Decreased interval fetal growth. The Ac is at the 9%, and the EFW is at the 7% percentile. repeat doppler, BPP and AFV evaluation scheduled weekly.   Added to case review for may     2025-BP score 8/8              Pruritus of pregnancy, antepartum ([facility]) 3/12/2025 by SANDRA Leach  No    Priority:  Medium       Overview Addendum 2025 11:34 PM by SANDRA Leach   Patient reports itching on hands and feet that has been getting worse and areas of itching on her body. Labs ordered 3/11 - neg          37 weeks gestation of pregnancy ([facility]) 2024 by SANDRA Leach  No    Priority:  Medium       Overview Addendum 2025 12:14 PM by SANDRA Leach   Desired provider in labor: [x] CNM  [] Physician  [x] Blood Products: [x] Yes, accepts [] No, needs counseling  [x] Initial BMI: 35.34   [x] Prenatal Labs:   [x] Cervical Cancer Screening up  to date  -  result pending   [x] Rh status: pos  [x] Genetic Screening:  ccfDNA  neg - its a girl!  [x] NT US: (11-13 wks)  [x] Baby ASA (if indicated): pt is taking   [x] Pregnancy dated by: 7.4 week US     [x] Anatomy US: (19-20 wks)  [] Federal Sterilization consent signed (if indicated):  [x] 1hr GCT at 24-28wks:  [] Rhogam (if indicated):   [] Fetal Surveillance (if indicated):  [x] Tdap (27-32 wks, may be given up to 36 wks if initial window missed):   [] RSV (32-36 wks) (Sept. to end ):   [] Flu Vaccine:    [x] Breastfeeding: pumpimg, and bottle feeding; pump on hand already  [x] Postpartum Birth control method: does not want more kids - considering tubal   [x] GBS at 36 - 37 wks: talked about completing at next visit  [] 39 weeks discussion of IOL vs. Expectant management:  [] Mode of delivery ( anticipated ):          Supervision of other normal pregnancy, antepartum ([facility]) 2024 by SANDRA Leach  No    Priority:  Medium       Obesity in pregnancy ([facility]) 2024 by SANDRA Leach  No    Priority:  Medium       Overview Signed 2024  9:48 PM by SANDRA Leach   Growth scan 30 and 36 weeks, BPP or NST weekly 37 weeks to delivery, and recommended delivery 39 0/7 - 39 6/7   Starting BMI 35                 Concerns: none today, feeling well     Pain: controlled  Lacerations: none  Lochia: light  Sexual Intimacy: No  Contraceptive Method: desires Tubal Sterilization,  - has upcoming appt with Dr Maloney. Consent signed today.   Feeding Method: She is pumping exclusively. no breast or nursing problems  Lactation Consult Needed?: No    Birth Trauma: No  Bonding with Baby: well with baby girl, Caridad     Mood:   Postpartum Depression: Low Risk  (2025)    [place]  Depression Scale     Last EPDS Total Score: 0     Last EPDS Self Harm Result: Never         Objective    /90   Wt 105 kg (232 lb)   LMP 10/12/2024   Breastfeeding Yes   BMI 39.82  kg/m²    Physical Exam  Constitutional:       Appearance: Normal appearance. She is normal weight.   Genitourinary:      Genitourinary Comments: Pt declines with shared decision making      HENT:      Head: Normocephalic.      Nose: Nose normal.      Mouth/Throat:      Mouth: Mucous membranes are dry.      Pharynx: Oropharynx is clear.   Pulmonary:      Effort: Pulmonary effort is normal.   Abdominal:      General: Abdomen is flat.      Palpations: Abdomen is soft.   Musculoskeletal:         General: Normal range of motion.      Cervical back: Normal range of motion.   Neurological:      General: No focal deficit present.      Mental Status: She is alert and oriented to person, place, and time. Mental status is at baseline.   Skin:     General: Skin is warm and dry.   Psychiatric:         Mood and Affect: Mood normal.         Behavior: Behavior normal.         Thought Content: Thought content normal.         Judgment: Judgment normal.          Plan    Advised to call office for breast complaints, abnormal bleeding, mood changes, or other concerning symptoms.     Continue to monitor Bps twice daily as directed. Warning signs and call/return parameters reviewed.     Pt to keep upcoming consult with Dr Maloney.   Follow up for routine PP care.    Diagnoses and all orders for this visit:  Routine postpartum follow-up  History of pre-eclampsia  Contraceptive education      Dorothy Edouard, MARTY-ELEANOR

## 2025-07-07 NOTE — PATIENT INSTRUCTIONS
Please check your blood pressure twice a day; when you're resting, sitting upright, feet flat on the floor, in left arm resting on a table. If the top number is greater than 160 and/or the bottom number is greater than 110, call your OB provider immediately or come to the nearest emergency room. If your blood pressure is between 150/100 and 160/110, rest for 1 hour and then recheck you blood pressure. If on recheck it is still between 150/100 and 160/110, call your OB provider or go to the nearest emergency room.     room air

## 2025-07-08 ENCOUNTER — APPOINTMENT (OUTPATIENT)
Dept: RADIOLOGY | Facility: CLINIC | Age: 35
End: 2025-07-08
Payer: COMMERCIAL

## 2025-07-08 ENCOUNTER — APPOINTMENT (OUTPATIENT)
Dept: OBSTETRICS AND GYNECOLOGY | Facility: CLINIC | Age: 35
End: 2025-07-08
Payer: COMMERCIAL

## 2025-07-09 LAB
CLINICAL SIG UPDATED INFO: NORMAL
LABORATORY COMMENT REPORT: NORMAL
PATH REPORT.FINAL DX SPEC: NORMAL
PATH REPORT.GROSS SPEC: NORMAL
PATH REPORT.RELEVANT HX SPEC: NORMAL
PATH REPORT.TOTAL CANCER: NORMAL

## 2025-07-30 ENCOUNTER — PREP FOR PROCEDURE (OUTPATIENT)
Dept: OBSTETRICS AND GYNECOLOGY | Facility: CLINIC | Age: 35
End: 2025-07-30

## 2025-07-30 ENCOUNTER — APPOINTMENT (OUTPATIENT)
Dept: OBSTETRICS AND GYNECOLOGY | Facility: CLINIC | Age: 35
End: 2025-07-30
Payer: COMMERCIAL

## 2025-07-30 VITALS
WEIGHT: 231.8 LBS | HEIGHT: 64 IN | SYSTOLIC BLOOD PRESSURE: 110 MMHG | BODY MASS INDEX: 39.57 KG/M2 | DIASTOLIC BLOOD PRESSURE: 70 MMHG

## 2025-07-30 DIAGNOSIS — Z30.2 ENCOUNTER FOR STERILIZATION: Primary | ICD-10-CM

## 2025-07-30 DIAGNOSIS — E66.9 OBESITY (BMI 30-39.9): ICD-10-CM

## 2025-07-30 DIAGNOSIS — Z30.2 REQUEST FOR STERILIZATION: Primary | ICD-10-CM

## 2025-07-30 PROBLEM — Q27.0 SINGLE UMBILICAL ARTERY (HHS-HCC): Status: RESOLVED | Noted: 2025-02-25 | Resolved: 2025-07-30

## 2025-07-30 PROCEDURE — 99204 OFFICE O/P NEW MOD 45 MIN: CPT | Performed by: OBSTETRICS & GYNECOLOGY

## 2025-07-30 PROCEDURE — 3008F BODY MASS INDEX DOCD: CPT | Performed by: OBSTETRICS & GYNECOLOGY

## 2025-07-30 PROCEDURE — 1036F TOBACCO NON-USER: CPT | Performed by: OBSTETRICS & GYNECOLOGY

## 2025-07-30 RX ORDER — ACETAMINOPHEN 325 MG/1
975 TABLET ORAL ONCE
OUTPATIENT
Start: 2025-07-30 | End: 2025-07-30

## 2025-07-30 RX ORDER — CELECOXIB 400 MG/1
400 CAPSULE ORAL ONCE
OUTPATIENT
Start: 2025-07-30 | End: 2025-07-30

## 2025-07-30 RX ORDER — SODIUM CHLORIDE, SODIUM LACTATE, POTASSIUM CHLORIDE, CALCIUM CHLORIDE 600; 310; 30; 20 MG/100ML; MG/100ML; MG/100ML; MG/100ML
20 INJECTION, SOLUTION INTRAVENOUS CONTINUOUS
OUTPATIENT
Start: 2025-07-30 | End: 2025-07-31

## 2025-07-30 RX ORDER — TRANEXAMIC ACID 650 MG/1
1300 TABLET ORAL ONCE
OUTPATIENT
Start: 2025-07-30 | End: 2025-07-30

## 2025-07-30 RX ORDER — GABAPENTIN 600 MG/1
600 TABLET ORAL ONCE
OUTPATIENT
Start: 2025-07-30 | End: 2025-07-30

## 2025-07-30 NOTE — PROGRESS NOTES
"GYNECOLOGY PROGRESS NOTE        CC:     Chief Complaint   Patient presents with    Consult     New patient - Tubal consult  G 4 P 4 - all vaginal  BC - nothing  Pap 2024        HPI:  Leighann Lu is scheduled today for office visit for sterilization consultation.     Childbearing completed.  No GYN c/o today.  Patient 100% certain about decision for sterilization.  No issues previously with surgery or anesthesia.  IS feeding baby some pumped breast milk, but may be stopping soon.  Current BC is nothing/postpartum abstinence.  S/P  on 25, had preeclampsia.      ROS:  GEN - no fevers or chills  RESP - no SOB or cough  URO - normal voids  GI - normal BMs  GYN - see HPI  PSYCH - no issues      HISTORY:  No past medical history on file.  Past Surgical History:  No date: NO PAST SURGERIES   reports that she has never smoked. She has never used smokeless tobacco. She reports that she does not drink alcohol and does not use drugs.      PHYSICAL EXAM:  /70 (BP Location: Left arm, Patient Position: Sitting)   Ht 1.626 m (5' 4\")   Wt 105 kg (231 lb 12.8 oz)   Breastfeeding Yes   BMI 39.79 kg/m²    GEN:  A&O, NAD  ABD:  no umbilical hernias  PSYCH:  normal affect, non-anxious      IMPRESSION/PLAN:  Problem List Items Addressed This Visit    None  Visit Diagnoses         Request for sterilization    -  Primary      Obesity (BMI 30-39.9)               Sterilization consultation:  All alternatives to sterilization reviewed, including hormonal birth control and LARCs and vasectomy.  Patient 100% certain about her decision to proceed.  Surgical options for sterilization reviewed--including Filshie clips, bilateral partial salpingectomy, and complete salpingectomy.  Decrease in future ovarian cancer risk with complete salpingectomy reviewed with patient, patient elects to proceed with this surgical option.  Reversal options reviewed (and usually self-pay, none and need for IVF if complete salpingectomy).  " [facility] sterilization papers to be signed today, mandatory 30-day waiting period reviewed.  Surgical consent signed today, surgery risks reviewed (bleeding, infection, anesthesia complications, sterilization regret, increased ectopic pregnancy risk, injury to other tissues and organs or vessels, adhesions, need for re-operation).  Patient would accept a blood transfusion.  Preoperative and postoperative ERAS protocol and Rx reviewed with the patient and use/AE reviewed--no breast-feeding the day of surgery will need to use oxycodone rather than normal Ultram Rx for a postop narcotic.  Surgery comorbidities include obesity.  PAT labs ordered.  COVID and surgery counseling done.  Pre-op and postop instructions  were reviewed with the patient.        Umang Maloney DO

## 2025-08-01 NOTE — PREPROCEDURE INSTRUCTIONS

## 2025-08-04 ENCOUNTER — LAB (OUTPATIENT)
Facility: HOSPITAL | Age: 35
End: 2025-08-04
Payer: COMMERCIAL

## 2025-08-04 LAB
ANION GAP SERPL CALC-SCNC: 10 MMOL/L (ref 10–20)
BUN SERPL-MCNC: 11 MG/DL (ref 6–23)
CALCIUM SERPL-MCNC: 8.9 MG/DL (ref 8.6–10.3)
CHLORIDE SERPL-SCNC: 105 MMOL/L (ref 98–107)
CO2 SERPL-SCNC: 28 MMOL/L (ref 21–32)
CREAT SERPL-MCNC: 0.86 MG/DL (ref 0.5–1.05)
EGFRCR SERPLBLD CKD-EPI 2021: 89 ML/MIN/1.73M*2
ERYTHROCYTE [DISTWIDTH] IN BLOOD BY AUTOMATED COUNT: 14.5 % (ref 11.5–14.5)
GLUCOSE SERPL-MCNC: 91 MG/DL (ref 74–99)
HCT VFR BLD AUTO: 34.4 % (ref 36–46)
HGB BLD-MCNC: 11.4 G/DL (ref 12–16)
MCH RBC QN AUTO: 27.7 PG (ref 26–34)
MCHC RBC AUTO-ENTMCNC: 33.1 G/DL (ref 32–36)
MCV RBC AUTO: 84 FL (ref 80–100)
NRBC BLD-RTO: 0 /100 WBCS (ref 0–0)
PLATELET # BLD AUTO: 263 X10*3/UL (ref 150–450)
POTASSIUM SERPL-SCNC: 4.1 MMOL/L (ref 3.5–5.3)
RBC # BLD AUTO: 4.11 X10*6/UL (ref 4–5.2)
SODIUM SERPL-SCNC: 139 MMOL/L (ref 136–145)
WBC # BLD AUTO: 5.7 X10*3/UL (ref 4.4–11.3)

## 2025-08-04 PROCEDURE — 85027 COMPLETE CBC AUTOMATED: CPT | Performed by: OBSTETRICS & GYNECOLOGY

## 2025-08-04 PROCEDURE — 80048 BASIC METABOLIC PNL TOTAL CA: CPT | Performed by: OBSTETRICS & GYNECOLOGY

## 2025-08-07 ENCOUNTER — ANESTHESIA EVENT (OUTPATIENT)
Dept: OPERATING ROOM | Facility: HOSPITAL | Age: 35
End: 2025-08-07
Payer: COMMERCIAL

## 2025-08-07 PROBLEM — E66.09 CLASS 2 OBESITY DUE TO EXCESS CALORIES WITH BODY MASS INDEX (BMI) OF 39.0 TO 39.9 IN ADULT: Status: ACTIVE | Noted: 2024-11-23

## 2025-08-07 PROBLEM — E66.812 CLASS 2 OBESITY DUE TO EXCESS CALORIES WITH BODY MASS INDEX (BMI) OF 39.0 TO 39.9 IN ADULT: Status: ACTIVE | Noted: 2024-11-23

## 2025-08-07 SDOH — HEALTH STABILITY: MENTAL HEALTH: CURRENT SMOKER: 0

## 2025-08-07 NOTE — ANESTHESIA PREPROCEDURE EVALUATION
"Patient: Leighann Lu \"Ruth\"    Procedure Information       Date/Time: 08/08/25 0715    Procedure: Laparoscopy with bilateral salpingectomy (Bilateral)    Location: [facility] / [facility]    Surgeons: Umang Maloney, DO            Relevant Problems   Endocrine   (+) Class 2 obesity due to excess calories with body mass index (BMI) of 39.0 to 39.9 in adult       Clinical information reviewed:   Tobacco  Allergies  Meds  Problems  Med Hx  Surg Hx   Fam Hx  Soc   Hx        NPO Detail:  No data recorded     Physical Exam    Airway  Mallampati: II  TM distance: >3 FB  Neck ROM: full  Mouth opening: 3 or more finger widths     Cardiovascular    Dental - normal exam     Pulmonary    Abdominal            Anesthesia Plan    History of general anesthesia?: yes  History of complications of general anesthesia?: no    ASA 2     general     The patient is not a current smoker.    intravenous induction   Anesthetic plan and risks discussed with patient.    Plan discussed with CRNA.      "

## 2025-08-08 ENCOUNTER — ANESTHESIA (OUTPATIENT)
Dept: OPERATING ROOM | Facility: HOSPITAL | Age: 35
End: 2025-08-08
Payer: COMMERCIAL

## 2025-08-08 ENCOUNTER — PHARMACY VISIT (OUTPATIENT)
Dept: PHARMACY | Facility: CLINIC | Age: 35
End: 2025-08-08
Payer: COMMERCIAL

## 2025-08-08 ENCOUNTER — HOSPITAL ENCOUNTER (OUTPATIENT)
Facility: HOSPITAL | Age: 35
Setting detail: OUTPATIENT SURGERY
Discharge: HOME | End: 2025-08-08
Attending: OBSTETRICS & GYNECOLOGY | Admitting: OBSTETRICS & GYNECOLOGY
Payer: COMMERCIAL

## 2025-08-08 VITALS
DIASTOLIC BLOOD PRESSURE: 70 MMHG | WEIGHT: 230.38 LBS | TEMPERATURE: 97.2 F | HEIGHT: 64 IN | BODY MASS INDEX: 39.33 KG/M2 | SYSTOLIC BLOOD PRESSURE: 119 MMHG | RESPIRATION RATE: 18 BRPM | HEART RATE: 84 BPM | OXYGEN SATURATION: 97 %

## 2025-08-08 DIAGNOSIS — Z41.9 SURGERY, ELECTIVE: ICD-10-CM

## 2025-08-08 DIAGNOSIS — Z30.2 ENCOUNTER FOR STERILIZATION: ICD-10-CM

## 2025-08-08 DIAGNOSIS — G89.18 POSTOPERATIVE PAIN: Primary | ICD-10-CM

## 2025-08-08 LAB — PREGNANCY TEST URINE, POC: NEGATIVE

## 2025-08-08 PROCEDURE — 3600000004 HC OR TIME - INITIAL BASE CHARGE - PROCEDURE LEVEL FOUR: Performed by: OBSTETRICS & GYNECOLOGY

## 2025-08-08 PROCEDURE — 7100000001 HC RECOVERY ROOM TIME - INITIAL BASE CHARGE: Performed by: OBSTETRICS & GYNECOLOGY

## 2025-08-08 PROCEDURE — 2500000001 HC RX 250 WO HCPCS SELF ADMINISTERED DRUGS (ALT 637 FOR MEDICARE OP): Performed by: ANESTHESIOLOGY

## 2025-08-08 PROCEDURE — 2500000002 HC RX 250 W HCPCS SELF ADMINISTERED DRUGS (ALT 637 FOR MEDICARE OP, ALT 636 FOR OP/ED): Performed by: OBSTETRICS & GYNECOLOGY

## 2025-08-08 PROCEDURE — 0751T DGTZ GLS MCRSCP SLD LEVEL II: CPT | Mod: TC,ELYLAB | Performed by: OBSTETRICS & GYNECOLOGY

## 2025-08-08 PROCEDURE — 2500000004 HC RX 250 GENERAL PHARMACY W/ HCPCS (ALT 636 FOR OP/ED): Performed by: REGISTERED NURSE

## 2025-08-08 PROCEDURE — 88302 TISSUE EXAM BY PATHOLOGIST: CPT | Performed by: PATHOLOGY

## 2025-08-08 PROCEDURE — 3700000001 HC GENERAL ANESTHESIA TIME - INITIAL BASE CHARGE: Performed by: OBSTETRICS & GYNECOLOGY

## 2025-08-08 PROCEDURE — RXMED WILLOW AMBULATORY MEDICATION CHARGE

## 2025-08-08 PROCEDURE — 2500000001 HC RX 250 WO HCPCS SELF ADMINISTERED DRUGS (ALT 637 FOR MEDICARE OP): Performed by: OBSTETRICS & GYNECOLOGY

## 2025-08-08 PROCEDURE — 2500000004 HC RX 250 GENERAL PHARMACY W/ HCPCS (ALT 636 FOR OP/ED): Performed by: ANESTHESIOLOGY

## 2025-08-08 PROCEDURE — 58661 LAPAROSCOPY REMOVE ADNEXA: CPT

## 2025-08-08 PROCEDURE — 7100000010 HC PHASE TWO TIME - EACH INCREMENTAL 1 MINUTE: Performed by: OBSTETRICS & GYNECOLOGY

## 2025-08-08 PROCEDURE — 2500000005 HC RX 250 GENERAL PHARMACY W/O HCPCS: Performed by: OBSTETRICS & GYNECOLOGY

## 2025-08-08 PROCEDURE — 7100000002 HC RECOVERY ROOM TIME - EACH INCREMENTAL 1 MINUTE: Performed by: OBSTETRICS & GYNECOLOGY

## 2025-08-08 PROCEDURE — 2500000004 HC RX 250 GENERAL PHARMACY W/ HCPCS (ALT 636 FOR OP/ED): Performed by: OBSTETRICS & GYNECOLOGY

## 2025-08-08 PROCEDURE — 2720000007 HC OR 272 NO HCPCS: Performed by: OBSTETRICS & GYNECOLOGY

## 2025-08-08 PROCEDURE — 3700000002 HC GENERAL ANESTHESIA TIME - EACH INCREMENTAL 1 MINUTE: Performed by: OBSTETRICS & GYNECOLOGY

## 2025-08-08 PROCEDURE — 58661 LAPAROSCOPY REMOVE ADNEXA: CPT | Performed by: OBSTETRICS & GYNECOLOGY

## 2025-08-08 PROCEDURE — 7100000009 HC PHASE TWO TIME - INITIAL BASE CHARGE: Performed by: OBSTETRICS & GYNECOLOGY

## 2025-08-08 PROCEDURE — 3600000009 HC OR TIME - EACH INCREMENTAL 1 MINUTE - PROCEDURE LEVEL FOUR: Performed by: OBSTETRICS & GYNECOLOGY

## 2025-08-08 PROCEDURE — 81025 URINE PREGNANCY TEST: CPT | Performed by: OBSTETRICS & GYNECOLOGY

## 2025-08-08 RX ORDER — DOCUSATE SODIUM 100 MG/1
100 CAPSULE, LIQUID FILLED ORAL 2 TIMES DAILY
Qty: 60 CAPSULE | Refills: 0 | Status: SHIPPED | OUTPATIENT
Start: 2025-08-08 | End: 2025-09-07

## 2025-08-08 RX ORDER — SODIUM CHLORIDE, SODIUM LACTATE, POTASSIUM CHLORIDE, CALCIUM CHLORIDE 600; 310; 30; 20 MG/100ML; MG/100ML; MG/100ML; MG/100ML
20 INJECTION, SOLUTION INTRAVENOUS CONTINUOUS
Status: DISCONTINUED | OUTPATIENT
Start: 2025-08-08 | End: 2025-08-08 | Stop reason: HOSPADM

## 2025-08-08 RX ORDER — OXYCODONE HYDROCHLORIDE 5 MG/1
5 TABLET ORAL EVERY 4 HOURS PRN
Status: DISCONTINUED | OUTPATIENT
Start: 2025-08-08 | End: 2025-08-08 | Stop reason: HOSPADM

## 2025-08-08 RX ORDER — CELECOXIB 200 MG/1
400 CAPSULE ORAL ONCE
Status: COMPLETED | OUTPATIENT
Start: 2025-08-08 | End: 2025-08-08

## 2025-08-08 RX ORDER — GABAPENTIN 600 MG/1
600 TABLET ORAL 3 TIMES DAILY
Qty: 9 TABLET | Refills: 0 | Status: SHIPPED | OUTPATIENT
Start: 2025-08-08 | End: 2025-08-11

## 2025-08-08 RX ORDER — DIPHENHYDRAMINE HYDROCHLORIDE 50 MG/ML
INJECTION, SOLUTION INTRAMUSCULAR; INTRAVENOUS AS NEEDED
Status: DISCONTINUED | OUTPATIENT
Start: 2025-08-08 | End: 2025-08-08

## 2025-08-08 RX ORDER — MIDAZOLAM HYDROCHLORIDE 1 MG/ML
INJECTION, SOLUTION INTRAMUSCULAR; INTRAVENOUS AS NEEDED
Status: DISCONTINUED | OUTPATIENT
Start: 2025-08-08 | End: 2025-08-08

## 2025-08-08 RX ORDER — BUPIVACAINE HCL/EPINEPHRINE 0.25-.0005
VIAL (ML) INJECTION AS NEEDED
Status: DISCONTINUED | OUTPATIENT
Start: 2025-08-08 | End: 2025-08-08 | Stop reason: HOSPADM

## 2025-08-08 RX ORDER — ACETAMINOPHEN 500 MG
1000 TABLET ORAL EVERY 6 HOURS
Qty: 24 TABLET | Refills: 0 | Status: SHIPPED | OUTPATIENT
Start: 2025-08-08 | End: 2025-08-11

## 2025-08-08 RX ORDER — PROPOFOL 10 MG/ML
INJECTION, EMULSION INTRAVENOUS AS NEEDED
Status: DISCONTINUED | OUTPATIENT
Start: 2025-08-08 | End: 2025-08-08

## 2025-08-08 RX ORDER — ACETAMINOPHEN 325 MG/1
975 TABLET ORAL ONCE
Status: COMPLETED | OUTPATIENT
Start: 2025-08-08 | End: 2025-08-08

## 2025-08-08 RX ORDER — NAPROXEN 500 MG/1
500 TABLET ORAL 2 TIMES DAILY
Qty: 6 TABLET | Refills: 0 | Status: SHIPPED | OUTPATIENT
Start: 2025-08-08 | End: 2025-08-11

## 2025-08-08 RX ORDER — MEPERIDINE HYDROCHLORIDE 25 MG/ML
12.5 INJECTION INTRAMUSCULAR; INTRAVENOUS; SUBCUTANEOUS EVERY 10 MIN PRN
Status: DISCONTINUED | OUTPATIENT
Start: 2025-08-08 | End: 2025-08-08 | Stop reason: HOSPADM

## 2025-08-08 RX ORDER — LIDOCAINE HYDROCHLORIDE 20 MG/ML
INJECTION, SOLUTION INFILTRATION; PERINEURAL AS NEEDED
Status: DISCONTINUED | OUTPATIENT
Start: 2025-08-08 | End: 2025-08-08

## 2025-08-08 RX ORDER — SODIUM CHLORIDE, SODIUM LACTATE, POTASSIUM CHLORIDE, CALCIUM CHLORIDE 600; 310; 30; 20 MG/100ML; MG/100ML; MG/100ML; MG/100ML
100 INJECTION, SOLUTION INTRAVENOUS CONTINUOUS
Status: DISCONTINUED | OUTPATIENT
Start: 2025-08-08 | End: 2025-08-08 | Stop reason: HOSPADM

## 2025-08-08 RX ORDER — TRANEXAMIC ACID 650 MG/1
1300 TABLET ORAL ONCE
Status: COMPLETED | OUTPATIENT
Start: 2025-08-08 | End: 2025-08-08

## 2025-08-08 RX ORDER — GABAPENTIN 300 MG/1
600 CAPSULE ORAL ONCE
Status: COMPLETED | OUTPATIENT
Start: 2025-08-08 | End: 2025-08-08

## 2025-08-08 RX ORDER — FENTANYL CITRATE 50 UG/ML
INJECTION, SOLUTION INTRAMUSCULAR; INTRAVENOUS AS NEEDED
Status: DISCONTINUED | OUTPATIENT
Start: 2025-08-08 | End: 2025-08-08

## 2025-08-08 RX ORDER — ONDANSETRON HYDROCHLORIDE 2 MG/ML
INJECTION, SOLUTION INTRAVENOUS AS NEEDED
Status: DISCONTINUED | OUTPATIENT
Start: 2025-08-08 | End: 2025-08-08

## 2025-08-08 RX ORDER — FENTANYL CITRATE 50 UG/ML
50 INJECTION, SOLUTION INTRAMUSCULAR; INTRAVENOUS EVERY 5 MIN PRN
Status: DISCONTINUED | OUTPATIENT
Start: 2025-08-08 | End: 2025-08-08 | Stop reason: HOSPADM

## 2025-08-08 RX ORDER — ROCURONIUM BROMIDE 10 MG/ML
INJECTION, SOLUTION INTRAVENOUS AS NEEDED
Status: DISCONTINUED | OUTPATIENT
Start: 2025-08-08 | End: 2025-08-08

## 2025-08-08 RX ORDER — TRAMADOL HYDROCHLORIDE 50 MG/1
50 TABLET, FILM COATED ORAL EVERY 8 HOURS PRN
Qty: 12 TABLET | Refills: 0 | Status: SHIPPED | OUTPATIENT
Start: 2025-08-08 | End: 2025-08-12

## 2025-08-08 RX ORDER — FAMOTIDINE 10 MG/ML
INJECTION INTRAVENOUS AS NEEDED
Status: DISCONTINUED | OUTPATIENT
Start: 2025-08-08 | End: 2025-08-08

## 2025-08-08 RX ORDER — ONDANSETRON HYDROCHLORIDE 2 MG/ML
4 INJECTION, SOLUTION INTRAVENOUS ONCE AS NEEDED
Status: DISCONTINUED | OUTPATIENT
Start: 2025-08-08 | End: 2025-08-08 | Stop reason: HOSPADM

## 2025-08-08 RX ADMIN — DIPHENHYDRAMINE HYDROCHLORIDE 12.5 MG: 50 INJECTION, SOLUTION INTRAMUSCULAR; INTRAVENOUS at 07:48

## 2025-08-08 RX ADMIN — SODIUM CHLORIDE, SODIUM LACTATE, POTASSIUM CHLORIDE, AND CALCIUM CHLORIDE 20 ML/HR: .6; .31; .03; .02 INJECTION, SOLUTION INTRAVENOUS at 06:22

## 2025-08-08 RX ADMIN — DEXAMETHASONE SODIUM PHOSPHATE 8 MG: 4 INJECTION, SOLUTION INTRAMUSCULAR; INTRAVENOUS at 07:48

## 2025-08-08 RX ADMIN — FENTANYL CITRATE 50 MCG: 50 INJECTION, SOLUTION INTRAMUSCULAR; INTRAVENOUS at 07:36

## 2025-08-08 RX ADMIN — HYDROMORPHONE HYDROCHLORIDE 0.5 MG: 1 INJECTION, SOLUTION INTRAMUSCULAR; INTRAVENOUS; SUBCUTANEOUS at 09:06

## 2025-08-08 RX ADMIN — MIDAZOLAM 2 MG: 1 INJECTION INTRAMUSCULAR; INTRAVENOUS at 07:30

## 2025-08-08 RX ADMIN — ROCURONIUM BROMIDE 80 MG: 10 SOLUTION INTRAVENOUS at 07:36

## 2025-08-08 RX ADMIN — FAMOTIDINE 20 MG: 10 INJECTION, SOLUTION INTRAVENOUS at 07:47

## 2025-08-08 RX ADMIN — LIDOCAINE HYDROCHLORIDE 60 MG: 20 INJECTION, SOLUTION INFILTRATION; PERINEURAL at 07:36

## 2025-08-08 RX ADMIN — SUGAMMADEX 200 MG: 100 INJECTION, SOLUTION INTRAVENOUS at 08:28

## 2025-08-08 RX ADMIN — FENTANYL CITRATE 50 MCG: 50 INJECTION INTRAMUSCULAR; INTRAVENOUS at 08:55

## 2025-08-08 RX ADMIN — ACETAMINOPHEN 975 MG: 325 TABLET ORAL at 06:11

## 2025-08-08 RX ADMIN — ONDANSETRON 4 MG: 2 INJECTION, SOLUTION INTRAMUSCULAR; INTRAVENOUS at 08:20

## 2025-08-08 RX ADMIN — OXYCODONE HYDROCHLORIDE 5 MG: 5 TABLET ORAL at 09:55

## 2025-08-08 RX ADMIN — PROPOFOL 200 MG: 10 INJECTION, EMULSION INTRAVENOUS at 07:36

## 2025-08-08 RX ADMIN — FENTANYL CITRATE 50 MCG: 50 INJECTION, SOLUTION INTRAMUSCULAR; INTRAVENOUS at 08:08

## 2025-08-08 RX ADMIN — GABAPENTIN 600 MG: 300 CAPSULE ORAL at 06:11

## 2025-08-08 RX ADMIN — CELECOXIB 400 MG: 200 CAPSULE ORAL at 06:11

## 2025-08-08 RX ADMIN — TRANEXAMIC ACID 1300 MG: 650 TABLET ORAL at 06:11

## 2025-08-08 ASSESSMENT — PAIN SCALES - GENERAL
PAINLEVEL_OUTOF10: 4
PAINLEVEL_OUTOF10: 1
PAINLEVEL_OUTOF10: 3
PAINLEVEL_OUTOF10: 4
PAINLEVEL_OUTOF10: 6
PAINLEVEL_OUTOF10: 3
PAINLEVEL_OUTOF10: 0 - NO PAIN
PAIN_LEVEL: 0
PAINLEVEL_OUTOF10: 3
PAINLEVEL_OUTOF10: 3

## 2025-08-08 ASSESSMENT — PAIN - FUNCTIONAL ASSESSMENT
PAIN_FUNCTIONAL_ASSESSMENT: 0-10

## 2025-08-08 ASSESSMENT — COLUMBIA-SUICIDE SEVERITY RATING SCALE - C-SSRS
2. HAVE YOU ACTUALLY HAD ANY THOUGHTS OF KILLING YOURSELF?: NO
1. IN THE PAST MONTH, HAVE YOU WISHED YOU WERE DEAD OR WISHED YOU COULD GO TO SLEEP AND NOT WAKE UP?: NO
6. HAVE YOU EVER DONE ANYTHING, STARTED TO DO ANYTHING, OR PREPARED TO DO ANYTHING TO END YOUR LIFE?: NO

## 2025-08-08 ASSESSMENT — PAIN DESCRIPTION - DESCRIPTORS
DESCRIPTORS: SORE

## 2025-08-08 NOTE — H&P
History Of Present Illness  Ruth Lu is a 35 y.o. female presenting with Encounter for sterilization [Z30.2]Pre-op Diagnosis      * Encounter for sterilization [Z30.2]      Past Medical History  Medical History[1]    Surgical History  Surgical History[2]     Social History  She reports that she has never smoked. She has never used smokeless tobacco. She reports that she does not drink alcohol and does not use drugs.    Family History  Family History[3]    Allergies  Patient has no known allergies.    Medications  Medication Documentation Review Audit       Reviewed by Kya Vaughn RN (Registered Nurse) on 08/08/25 at 0553      Medication Order Taking? Sig Documenting Provider Last Dose Status   acetaminophen (Tylenol) 325 mg tablet [id number] Yes Take 3 tablets (975 mg) by mouth every 6 hours if needed for mild pain (1 - 3) or moderate pain (4 - 6). PURVI Duran Past Month Active   fexofenadine HCl (ALLEGRA ORAL) [id number] Yes Take 1 tablet by mouth once daily. Historical Provider, MD 8/7/2025 Active   ibuprofen 600 mg tablet [id number] Yes Take 1 tablet (600 mg) by mouth every 6 hours if needed for mild pain (1 - 3) or moderate pain (4 - 6). PURVI Duran More than a month Active   polyethylene glycol (Glycolax, Miralax) 17 gram/dose powder [id number] Yes Mix 17 g of powder and drink 2 times a day as needed for constipation. PURVI Duran More than a month Active                      ROS:  GYN - see HPI  Remainder of the 12 point review of systems was performed and noncontributory    Physical Exam:  GEN:  A&O, NAD  HEENT:  head NC/AT, conjunctiva clear, no thyromegaly or goiter  CV:  RRR, no visible JVD  RESP:  symmetric respirations, no audible wheezing  ABD:  soft, NT/ND, no obvious masses  :  normal urethra, no bladder TTP  GYN:  deferred  NEURO:  CN 2-12 grossly intact  DERM:  no hirsutism or acne  PSYCH:  normal affect, non-anxious       Last Recorded  "Vitals  /70 Comment: CLARICE, ADULT DISPOSABLE CUFF  Pulse 89   Temp 36.2 °C (97.2 °F) (Temporal)   Resp 18   Ht 1.626 m (5' 4\")   Wt 104 kg (230 lb 6.1 oz)   SpO2 95%   BMI 39.54 kg/m²        Relevant Results       Assessment/Plan       RI LAPAROSCOPY W/RMVL ADNEXAL STRUCTURES [80333] (Laparoscopy with bilateral salpingectomy)         Umang Maloney DO         [1]   Past Medical History:  Diagnosis Date    COVID-19     VACCINATED    History of pre-eclampsia     Wears glasses    [2]   Past Surgical History:  Procedure Laterality Date    NO PAST SURGERIES     [3] No family history on file.    "

## 2025-08-08 NOTE — ANESTHESIA PROCEDURE NOTES
Airway  Date/Time: 8/8/2025 7:38 AM  Reason: elective    Airway not difficult    Staffing  Performed: CRNA   Authorized by: Remi Churchill MD    Performed by: MARTY Ramirez-SHERLYN  Patient location during procedure: OR    Patient Condition  Indications for airway management: anesthesia  Patient position: sniffing  MILS maintained throughout  Sedation level: deep     Final Airway Details   Preoxygenated: yes  Final airway type: endotracheal airway  Successful airway: ETT   Successful intubation technique: video laryngoscopy  Adjuncts used in placement: intubating stylet  Blade: Virgilio  Blade size: #3  ETT size (mm): 7.0  Cormack-Lehane Classification: grade I - full view of glottis  Placement verified by: capnometry   Measured from: lips  ETT to lips (cm): 21  Number of attempts at approach: 1    Additional Comments  Atraumatic. [place] 3

## 2025-08-08 NOTE — ANESTHESIA POSTPROCEDURE EVALUATION
"Patient: Leighann Lu \"Ruth\"    Procedure Summary       Date: 08/08/25 Room / Location: [facility] / [facility]    Anesthesia Start: 0730 Anesthesia Stop:     Procedure: Laparoscopy with bilateral salpingectomy (Bilateral) Diagnosis:       Encounter for sterilization      (Encounter for sterilization [Z30.2])    Surgeons: Umang Maloney DO Responsible Provider: Remi Churchill MD    Anesthesia Type: general ASA Status: 2            Anesthesia Type: general    Vitals Value Taken Time   /76 08/08/25 08:39   Temp 36.5 08/08/25 08:39   Pulse 89 08/08/25 08:39   Resp 16 08/08/25 08:39   SpO2 100 08/08/25 08:39       Anesthesia Post Evaluation    Patient location during evaluation: bedside  Patient participation: complete - patient participated  Level of consciousness: awake and alert  Pain score: 0  Pain management: adequate  Airway patency: patent  Cardiovascular status: acceptable and stable  Respiratory status: acceptable and face mask  Hydration status: acceptable  Postoperative Nausea and Vomiting: none        There were no known notable events for this encounter.    "

## 2025-08-08 NOTE — OP NOTE
Date: 25    OR Location: [facility]    Name:  Leighann Lu  : 1990 Age: 35 y.o. MRN: [id number] Sex: female     Diagnosis  Pre-op Diagnosis  Post-op Diagnosis     * Encounter for sterilization [Z30.2] Post-op Diagnosis  Post-op Diagnosis     * Encounter for sterilization [Z30.2]     Procedures  Laparoscopy with bilateral salpingectomy (27983)    Surgeons      * Umang Maloney - Primary    Resident/Fellow/Other Assistant:    Procedure Summary  Anesthesia: General  ASA: I  Anesthesia Staff: Remi Churchill MD      Estimated Blood Loss: < 10 mL    Findings:  normal uterus and bilateral fallopian tubes and ovaries    Specimens:  bilateral fallopian tubes    Procedure:  CLINICAL HISTORY:  Patient presents with undesired fertility.  Discussed management options including conservative therapy including LARCs and all other birth control.  Patient desires surgical management.  Risks, benefits, alternatives discussed with the patient.  Risks reviewed--including bleeding (requiring transfusion and transfusion reaction), infection (superficial or deep spaces), damage to other tissues (ureters, bladder, vessels, abdominal wall, or bowel), and potential of all risks that could require further surgery and/or prolonged hospitalization.  Patient desired to proceed with surgery and consent was appropriately signed.        PROCEDURE:    After informed consent the patient was brought to the operating room.  The patient was placed under general anesthesia without difficulty.  The patient was placed in the lithotomy position in yellowfin stirrups.  An exam under anesthesia was performed.  The patient was prepped and draped in the normal sterile fashion.  A timeout was performed.  The anterior lip of the cervix was grasped with a single-tooth tenaculum.  A uterine manipulator was placed into the uterine cavity.  Attention was then turned to the abdomen.  Marcaine was injected at the umbilicus and a 15 mm midline incision was  made with a scalpel, which was carried down to the underlying fascia which was then opened sharply, and then the peritoneal cavity was then opened bluntly.  A Single site gel port was placed.  The peritoneum was insufflated.   The abdomen was surveyed.   The right tubal fimbriae were tented up, sealed and divided away from the ovary and then the broad ligament and the uterus.  This was performed in a bilateral manner.  The tubes were then removed from the peritoneal cavity via the umbilical port site.  The surgical sites were re-inspected and were hemostatic.  The diaphragm was irrigated with a dilute bicarb and lidocaine solution.  The instruments were removed from the abdomen.  The fascia was closed at the umbilicus.  Fat was irrigated and then the skin was closed using a subcuticular stitch and then skin glue was placed.    The uterine manipulator was then removed from the patient's cervix and the tenaculum sites were hemostatic.  The procedure was then ended.  Surgical counts were reported as correct x 2.  Patient was awakened from general anesthesia without difficulty and was then transferred to PACU in stable condition.      PA served as the first surgical assistant for this surgery as there are no residents at this [facility].  The PA's assistance in the case included assisting with patient positioning, patient prepping and draping, laparoscopic camera management, assisting the surgeon with their laparoscopic instruments, assisting the surgeon with wound fascial closure, and with performing closure of the laparoscopic port sites.      Umang Maloney DO

## 2025-08-08 NOTE — DISCHARGE INSTRUCTIONS
SURGEON'S PHONE LIST PROVIDED TO PATIENT    General Anesthesia Discharge Instructions    About this topic  You may need general anesthesia if you need to be asleep during a procedure. Your doctor will use drugs to block the signals that go from your nerves to your brain. Doctors give general anesthesia during a surgery or procedure to:  Allow you to sleep  Help your body be still  Relax your muscles  Help you to relax and be pain free  Keep you from remembering the surgery  Let the doctor manage your airway, breathing, and blood flow  The doctor or nurse anesthetist gives general anesthesia by a shot into your vein. Sometimes, you may breathe in a gas through a mask placed over your face.  What care is needed at home?  Ask your doctor what you need to do when you go home. Make sure you ask questions if you do not understand what the doctor says.  Your doctor may give you drugs to prevent or treat an upset stomach from the anesthetic. Take them as ordered.  If your throat is sore, suck on ice chips or popsicles to ease throat pain.  Put 2 to 3 pillows under your head and back when you lie down to help you breathe easier.  For the first 24 to 48 hours:  Do not operate heavy or dangerous machinery.  Do not make major decisions or sign important papers. You may not be able to think clearly.  Avoid beer, wine, or mixed drinks.  You are at a higher risk of falling for at least 24 hours after general anesthesia.  Take extra care when you get up.  Do not change positions quickly.  Do not rush when you need to go to the bathroom or to answer the phone.  Ask for help if you feel unsteady when you try to walk.  Wear shoes with non-slip soles and low heels.  What follow-up care is needed?  Your doctor may ask you to come back to the office to check on your progress. Be sure to keep these visits.  If you have stitches that do not dissolve or staples, you will need to have them removed. Your doctor will want to do this in 1 to 2  weeks. If the doctor used skin glue, the glue will fall off on its own.  What drugs may be needed?  The doctor may order drugs to:  Help with pain  Treat an upset stomach or throwing up  Will physical activity be limited?  You will not be allowed to drive right away after the procedure. Ask a family member or a friend to drive you home.  Avoid trying to get out of bed without help until you are sure of your balance.  You may have to limit your activity. Talk to your doctor about if you need to limit how much you lift or limit exercise after your procedure.  What changes to diet are needed?  Start with a light diet when you are fully awake. This includes things that are easy to swallow like soups, pudding, jello, toast, and eggs. Slowly progress to your normal diet.  What problems could happen?  Low blood pressure  Breathing problems  Upset stomach or throwing up  Dizziness  Blood clots  Infection  When do I need to call the doctor?  Trouble breathing  Upset stomach or throwing up more than 3 times in the next 2 days  Dizziness  Teach Back: Helping You Understand  The Teach Back Method helps you understand the information we are giving you. After you talk with the staff, tell them in your own words what you learned. This helps to make sure the staff has described each thing clearly. It also helps to explain things that may have been confusing. Before going home, make sure you can do these:  I can tell you about my procedure.  I can tell you if I need to follow up with my doctor.  I can tell you what is good for me to eat and drink the next day.  I can tell you what I would do if I have trouble breathing, an upset stomach, or dizziness.  Where can I learn more?  [employer]  https://www.nigms.nih.gov/education/pages/factsheet_Anesthesia.aspx  [facility] Choices  http://www.nhs.uk/conditions/Anaesthetic-general/Pages/Definition.aspx  Last Reviewed Date  2020-04-22

## 2025-08-12 ENCOUNTER — APPOINTMENT (OUTPATIENT)
Dept: OBSTETRICS AND GYNECOLOGY | Facility: CLINIC | Age: 35
End: 2025-08-12
Payer: COMMERCIAL

## 2025-08-12 VITALS — WEIGHT: 235 LBS | SYSTOLIC BLOOD PRESSURE: 110 MMHG | BODY MASS INDEX: 40.34 KG/M2 | DIASTOLIC BLOOD PRESSURE: 70 MMHG

## 2025-08-12 DIAGNOSIS — Z87.59 HISTORY OF PRE-ECLAMPSIA: ICD-10-CM

## 2025-08-12 PROCEDURE — 0503F POSTPARTUM CARE VISIT: CPT

## 2025-08-12 RX ORDER — SERTRALINE HYDROCHLORIDE 50 MG/1
50 TABLET, FILM COATED ORAL SEE ADMIN INSTRUCTIONS
Qty: 90 TABLET | Refills: 3 | Status: SHIPPED | OUTPATIENT
Start: 2025-08-12 | End: 2026-02-08

## 2025-08-12 ASSESSMENT — EDINBURGH POSTNATAL DEPRESSION SCALE (EPDS)
TOTAL SCORE: 14
I HAVE BEEN SO UNHAPPY THAT I HAVE BEEN CRYING: ONLY OCCASIONALLY
I HAVE BEEN ABLE TO LAUGH AND SEE THE FUNNY SIDE OF THINGS: AS MUCH AS I ALWAYS COULD
THINGS HAVE BEEN GETTING ON TOP OF ME: YES, SOMETIMES I HAVEN'T BEEN COPING AS WELL AS USUAL
I HAVE LOOKED FORWARD WITH ENJOYMENT TO THINGS: AS MUCH AS I EVER DID
I HAVE BEEN SO UNHAPPY THAT I HAVE HAD DIFFICULTY SLEEPING: NOT VERY OFTEN
I HAVE BLAMED MYSELF UNNECESSARILY WHEN THINGS WENT WRONG: YES, MOST OF THE TIME
I HAVE BEEN ANXIOUS OR WORRIED FOR NO GOOD REASON: YES, VERY OFTEN
I HAVE FELT SCARED OR PANICKY FOR NO GOOD REASON: YES, QUITE A LOT
I HAVE FELT SAD OR MISERABLE: NOT VERY OFTEN
THE THOUGHT OF HARMING MYSELF HAS OCCURRED TO ME: NEVER

## 2025-08-20 ENCOUNTER — APPOINTMENT (OUTPATIENT)
Dept: OBSTETRICS AND GYNECOLOGY | Facility: CLINIC | Age: 35
End: 2025-08-20
Payer: COMMERCIAL

## 2025-08-20 DIAGNOSIS — Z09 POSTOP CHECK: Primary | ICD-10-CM

## 2025-08-20 PROCEDURE — 99212 OFFICE O/P EST SF 10 MIN: CPT | Performed by: OBSTETRICS & GYNECOLOGY

## 2025-08-20 PROCEDURE — 1036F TOBACCO NON-USER: CPT | Performed by: OBSTETRICS & GYNECOLOGY

## 2025-08-26 LAB
LABORATORY COMMENT REPORT: NORMAL
PATH REPORT.FINAL DX SPEC: NORMAL
PATH REPORT.GROSS SPEC: NORMAL
PATH REPORT.RELEVANT HX SPEC: NORMAL
PATH REPORT.TOTAL CANCER: NORMAL

## 2025-10-21 ENCOUNTER — APPOINTMENT (OUTPATIENT)
Dept: OBSTETRICS AND GYNECOLOGY | Facility: CLINIC | Age: 35
End: 2025-10-21
Payer: COMMERCIAL

## (undated) DEVICE — POSITIONING SYSTEM, PAGAZZI, PATIENT

## (undated) DEVICE — TOWEL PACK, STERILE, 16X24, XRAY DETECTABLE, BLUE, 4/PK

## (undated) DEVICE — Device

## (undated) DEVICE — SUTURE, MONOCRYL, 4-0, 27 IN, PS-2, UNDYED

## (undated) DEVICE — GLOVE, POLYISOPRENE, SZ 6.5, PF,LF

## (undated) DEVICE — DRAPE, LEGGINGS, 28.5 X 43 IN, DISPOSABLE, LF, STERILE

## (undated) DEVICE — APPLICATOR, CHLORAPREP, W/ORANGE TINT, 26ML

## (undated) DEVICE — GOWN, SURGICAL, ROYAL SILK, LG, STERILE

## (undated) DEVICE — MANIFOLD, 4 PORT NEPTUNE STANDARD

## (undated) DEVICE — GLOVE, SURGICAL, PROTEXIS PI , 7.0, PF, LF

## (undated) DEVICE — PAD, SANITARY, OBSTETRICAL, W/ADHSV STRIP,11 IN,LF

## (undated) DEVICE — TRAY, DRY PREP, PREMIUM

## (undated) DEVICE — NEEDLE, ECLIPSE, 25GA X 1-1/2 IN

## (undated) DEVICE — ADHESIVE, SKIN, DERMABOND ADVANCED, 15CM, PEN-STYLE

## (undated) DEVICE — GOWN, SURGICAL, ROYAL SILK, XL, STERILE

## (undated) DEVICE — SUTURE, VICRYL PLUS, 0, 27IN, UR-6, VIOLET, BRAIDED

## (undated) DEVICE — SOLUTION, IRRIGATION, USP, SODIUM CHLORIDE 0.9%, 3000 ML

## (undated) DEVICE — PREP, SCRUB, SKIN, FOAM, HIBICLENS, 4 OZ

## (undated) DEVICE — WARMER, DUAL SCOPE

## (undated) DEVICE — DEVICE, GELPOINT, SINGLE PORT

## (undated) DEVICE — SUTURE, VICRYL, 3-0, 27 IN, CT-1, UNDYED

## (undated) DEVICE — LIGASURE, SEALER/DIVIDER MARYLAND JAW, 5MM

## (undated) DEVICE — DRAPE PACK, LAVH, W/ATTACHED LEGGINGS, W/POUCH, 100 X 114 IN, LF, STERILE

## (undated) DEVICE — HOLSTER, JET SAFETY

## (undated) DEVICE — PUMP, STRYKERFLOW 2 & HANDPIECE W/10FT. IRRIGATION TUBING